# Patient Record
Sex: MALE | Race: WHITE | NOT HISPANIC OR LATINO | URBAN - METROPOLITAN AREA
[De-identification: names, ages, dates, MRNs, and addresses within clinical notes are randomized per-mention and may not be internally consistent; named-entity substitution may affect disease eponyms.]

---

## 2024-05-03 ENCOUNTER — OFFICE VISIT (OUTPATIENT)
Dept: PRIMARY CARE | Facility: CLINIC | Age: 49
End: 2024-05-03
Payer: COMMERCIAL

## 2024-05-03 VITALS
BODY MASS INDEX: 25.88 KG/M2 | HEIGHT: 68 IN | DIASTOLIC BLOOD PRESSURE: 71 MMHG | SYSTOLIC BLOOD PRESSURE: 110 MMHG | WEIGHT: 170.8 LBS | HEART RATE: 78 BPM

## 2024-05-03 DIAGNOSIS — Z00.00 ANNUAL PHYSICAL EXAM: Primary | ICD-10-CM

## 2024-05-03 DIAGNOSIS — R97.20 ELEVATED PSA: ICD-10-CM

## 2024-05-03 PROCEDURE — 99386 PREV VISIT NEW AGE 40-64: CPT | Performed by: INTERNAL MEDICINE

## 2024-05-03 PROCEDURE — 1036F TOBACCO NON-USER: CPT | Performed by: INTERNAL MEDICINE

## 2024-05-03 RX ORDER — BISMUTH SUBSALICYLATE 262 MG
1 TABLET,CHEWABLE ORAL DAILY
COMMUNITY

## 2024-05-03 ASSESSMENT — ENCOUNTER SYMPTOMS
LIGHT-HEADEDNESS: 0
SHORTNESS OF BREATH: 0
BACK PAIN: 1
UNEXPECTED WEIGHT CHANGE: 0
HEADACHES: 1
FREQUENCY: 0
DIZZINESS: 0
ARTHRALGIAS: 1
BLOOD IN STOOL: 0
SLEEP DISTURBANCE: 1
FEVER: 0
CONSTIPATION: 0
CHILLS: 0
FATIGUE: 0
DIFFICULTY URINATING: 0

## 2024-05-03 NOTE — PROGRESS NOTES
"Subjective   Patient ID: Michael Tellez is a 48 y.o. male who presents for Establish Care (White blood cell count low /).    Here to get established. Had physical with prior PCP recently. Labwork done showing low WBC count and PSA higher than normal.     PMH:  -Seen at [facility] recently was started on topical fluorouracil.  -Recent knee injury. Seen by ortho and recommended to start PT.    Tries to exercise 4-5 days a week. Will ride his bike and walk the dog.        Review of Systems   Constitutional:  Negative for chills, fatigue, fever and unexpected weight change.   Respiratory:  Negative for shortness of breath.    Cardiovascular:  Negative for chest pain.   Gastrointestinal:  Negative for blood in stool and constipation.   Genitourinary:  Negative for decreased urine volume, difficulty urinating and frequency.   Musculoskeletal:  Positive for arthralgias and back pain.   Neurological:  Positive for headaches. Negative for dizziness and light-headedness.   Psychiatric/Behavioral:  Positive for sleep disturbance.          /71 (BP Location: Right arm, Patient Position: Sitting, BP Cuff Size: Adult)   Pulse 78   Ht 1.73 m (5' 8.11\")   Wt 77.5 kg (170 lb 12.8 oz)   BMI 25.89 kg/m²   Objective   Physical Exam  Constitutional:       General: He is not in acute distress.     Appearance: He is not ill-appearing, toxic-appearing or diaphoretic.   HENT:      Head: Normocephalic and atraumatic.   Eyes:      General: No scleral icterus.     Conjunctiva/sclera: Conjunctivae normal.   Cardiovascular:      Rate and Rhythm: Normal rate and regular rhythm.      Heart sounds: No murmur heard.     No friction rub. No gallop.   Pulmonary:      Effort: Pulmonary effort is normal. No respiratory distress.      Breath sounds: No stridor. No wheezing, rhonchi or rales.   Abdominal:      General: Abdomen is flat. Bowel sounds are normal. There is no distension.      Palpations: Abdomen is soft.      Tenderness: " There is no abdominal tenderness. There is no guarding.   Musculoskeletal:      Cervical back: Normal range of motion and neck supple. No tenderness.   Lymphadenopathy:      Cervical: No cervical adenopathy.   Skin:     General: Skin is warm and dry.   Neurological:      Mental Status: He is alert.         Assessment/Plan   Problem List Items Addressed This Visit    None  Visit Diagnoses         Codes    Annual physical exam    -  Primary Z00.00    Elevated PSA     R97.20    Relevant Orders    Referral to Urology        -Labwork from last PCP reviewed w/ pt. Given family hx, pt would like urology referral within our system. Order placed.  -Pt to let us know if he develops symptoms like fevers, chills, night sweats, weight loss. Not currently worried about low WBC count in absence of symptoms. Pt's WBC has consistently been around 3-4.  -Discussed TDAP. Will follow up at next appt.  -To return in 1 year.         Annemarie Calloway MD 05/03/24 9:42 AM

## 2024-05-23 ENCOUNTER — OFFICE VISIT (OUTPATIENT)
Dept: UROLOGY | Facility: HOSPITAL | Age: 49
End: 2024-05-23
Payer: COMMERCIAL

## 2024-05-23 DIAGNOSIS — R97.20 ELEVATED PSA: ICD-10-CM

## 2024-05-23 PROCEDURE — 99214 OFFICE O/P EST MOD 30 MIN: CPT | Performed by: UROLOGY

## 2024-05-23 PROCEDURE — 1036F TOBACCO NON-USER: CPT | Performed by: UROLOGY

## 2024-05-23 NOTE — PROGRESS NOTES
"NPV     HISTORY OF PRESENT ILLNESS:   Michael Tellez is a 48 y.o. male who is being seen as a new patient today for elevated PSA. Fhx of prostate cancer (father and older brother)    PAST MEDICAL HISTORY:  No past medical history on file.    PAST SURGICAL HISTORY:  Past Surgical History:   Procedure Laterality Date    HERNIA REPAIR  2000    KNEE CARTILAGE SURGERY Right 2019    TONSILLECTOMY          ALLERGIES:   Allergies   Allergen Reactions    Codeine Nausea And Vomiting, Nausea Only and Hives     nausea        MEDICATIONS:   Current Outpatient Medications   Medication Instructions    multivitamin tablet 1 tablet, oral, Daily        PHYSICAL EXAM:  There were no vitals taken for this visit.  Constitutional: Patient appears well-developed and well-nourished. No distress.    Pulmonary/Chest: Effort normal. No respiratory distress.   Abdominal: Soft, ND NT  : WNL  Musculoskeletal: Normal range of motion.    Neurological: Alert and oriented to person, place, and time.  Psychiatric: Normal mood and affect. Behavior is normal. Thought content normal.      Labs:  No results found for: \"TESTOSTERONE\"  No results found for: \"PSA\"  Most recent PSA was 1.10 on 4/22/24, prior PSA was 0.811 on 9/26/23  No components found for: \"CBC\"  Lab Results   Component Value Date    CREATININE 1.05 05/16/2018     No components found for: \"TESTOTMS\"  No results found for: \"TESTF\"    Discussion:  Doing well, no complaints. Denies any dysuria, hematuria, bothersome urinary frequency, urgency, or flank pain. Patient denies any pushing or straining to urinate. Discussed with patient PSA was currently within WNL, pt reassured. Explained that he should return if his PSA elevates at greater than .75 in one years time. He should follow with PCP and DREs once yearly.    KAROL 25  AUA 21    Assessment:      1. Elevated PSA  Referral to Urology          Michael Tellez is a 48 y.o. male here for NPV     Plan:   Pt will continue to follow PSA with PSA " he was provided guidance on when to see further urological care.  All questions and concerns were addressed. Patient verbalizes understanding and has no other questions at this time.     Scribe Attestation  By signing my name below, I, Sweta Hernandez   attest that this documentation has been prepared under the direction and in the presence of Jb Mno MD.

## 2024-07-23 ENCOUNTER — OFFICE VISIT (OUTPATIENT)
Dept: ORTHOPEDIC SURGERY | Facility: CLINIC | Age: 49
End: 2024-07-23
Payer: COMMERCIAL

## 2024-07-23 VITALS — BODY MASS INDEX: 25.01 KG/M2 | WEIGHT: 165 LBS | HEIGHT: 68 IN

## 2024-07-23 DIAGNOSIS — M76.52 PATELLAR TENDINITIS, LEFT KNEE: Primary | ICD-10-CM

## 2024-07-23 DIAGNOSIS — M25.562 ACUTE PAIN OF LEFT KNEE: ICD-10-CM

## 2024-07-23 PROCEDURE — 3008F BODY MASS INDEX DOCD: CPT | Performed by: FAMILY MEDICINE

## 2024-07-23 PROCEDURE — 99203 OFFICE O/P NEW LOW 30 MIN: CPT | Performed by: FAMILY MEDICINE

## 2024-07-23 PROCEDURE — 1036F TOBACCO NON-USER: CPT | Performed by: FAMILY MEDICINE

## 2024-07-23 NOTE — PROGRESS NOTES
History of Present Illness   Chief Complaint   Patient presents with    Left Knee - Pain     May have injured while skiing 3/2024  +pain with some swelling       The patient is 48 y.o. male  here with a complaint of left knee pain.  Onset of symptoms 4 months ago, says that he was skiing in [place], had a fall, believes his ski did pop off, had some pain in his knee after this, it was his last day skiing, says he was able to finish the day skiing but with some pain.  Symptoms persisted over the next month prompting him to be seen by orthopedic provider [facility] who he has seen in the past for a right knee meniscus tear.  He says that he had x-rays done then and was told they were normal, he was referred to physical therapy which he did for approximately 6 weeks and has continued doing exercises on his own.  He feels like symptoms have not improved much since starting physical therapy.  He points to the anterior aspect of his knee as area of discomfort.  Patient is active he enjoys working out, he does have pain with squatting, higher levels of activity, he is also having some discomfort with normal day-to-day activity at times, he feels pain when he is lying in bed at night.  He denies any swelling, no locking or catching, no feelings of instability.  He has a big ski trip next winter to [place] that he is hoping to be in better shape for with regards to his knee.  He has tried over-the-counter medications for pain.    No past medical history on file.    Medication Documentation Review Audit       Reviewed by Annemarie Calloway MD (Physician) on 05/03/24 at 0837      Medication Order Taking? Sig Documenting Provider Last Dose Status   multivitamin tablet [id number]  Take 1 tablet by mouth once daily. Historical Provider, MD  Active                    Allergies   Allergen Reactions    Codeine Nausea And Vomiting, Nausea Only and Hives     nausea       Social History     Socioeconomic History     Marital status:      Spouse name: Not on file    Number of children: Not on file    Years of education: Not on file    Highest education level: Not on file   Occupational History    Not on file   Tobacco Use    Smoking status: Never    Smokeless tobacco: Never   Substance and Sexual Activity    Alcohol use: Yes     Alcohol/week: 1.0 standard drink of alcohol     Types: 1 Cans of beer per week     Comment: 4-5 beers a week.    Drug use: Never    Sexual activity: Not on file   Other Topics Concern    Not on file   Social History Narrative    Not on file     Social Determinants of Health     Financial Resource Strain: Low Risk  (9/11/2023)    Received from [employer]    Overall Financial Resource Strain (CARDIA)     Difficulty of Paying Living Expenses: Not hard at all   Food Insecurity: No Food Insecurity (9/11/2023)    Received from [employer]    Hunger Vital Sign     Worried About Running Out of Food in the Last Year: Never true     Ran Out of Food in the Last Year: Never true   Transportation Needs: No Transportation Needs (9/11/2023)    Received from [employer]    PRAPARE - Transportation     Lack of Transportation (Medical): No     Lack of Transportation (Non-Medical): No   Physical Activity: Sufficiently Active (9/11/2023)    Received from [employer]    Exercise Vital Sign     Days of Exercise per Week: 7 days     Minutes of Exercise per Session: 60 min   Stress: No Stress Concern Present (9/11/2023)    Received from [employer]    [employer] [place] [employer] - Occupational Stress Questionnaire     Feeling of Stress : Only a little   Social Connections: Socially Integrated (9/11/2023)    Received from [employer]    Social Connection and Isolation Panel [NHANES]     Frequency of Communication with Friends and Family: More than three times a week     Frequency of Social Gatherings with Friends and Family: Once  a week     Attends Jehovah's witness Services: More than 4 times per year     Active Member of Clubs or Organizations: Yes     Attends Club or Organization Meetings: More than 4 times per year     Marital Status:    Intimate Partner Violence: Not At Risk (9/11/2023)    Received from [employer]    Humiliation, Afraid, Rape, and Kick questionnaire     Fear of Current or Ex-Partner: No     Emotionally Abused: No     Physically Abused: No     Sexually Abused: No   Housing Stability: Unknown (9/11/2023)    Received from [employer]    Housing Stability Vital Sign     Unable to Pay for Housing in the Last Year: No     Number of Places Lived in the Last Year: Not on file     Unstable Housing in the Last Year: No       Past Surgical History:   Procedure Laterality Date    HERNIA REPAIR  2000    KNEE CARTILAGE SURGERY Right 2019    TONSILLECTOMY            Review of Systems   GENERAL: Negative  GI: Negative  MUSCULOSKELETAL: See HPI  SKIN: Negative  NEURO:  Negative     Physical Exam:    General/Constitutional: well appearing, no distress, appears stated age  HEENT: sclera clear  Respiratory: non labored breathing  Vascular: No edema, swelling or tenderness, except as noted in detailed exam.  Integumentary: No impressive skin lesions present, except as noted in detailed exam.  Neurological:  Alert and oriented   Psychological:  Normal mood and affect.  Musculoskeletal: Normal, except as noted in detailed exam and in HPI.  Normal gait, unassisted    Left knee: Normal appearance, there is no swelling, there is no redness or warmth, no joint effusion is present.  He is most focally tender palpation at the proximal aspect of the patella tendon just distal to the inferior pole of the patella.  There is some very mild anterior lateral joint tenderness, there is no medial joint tenderness.  Range of motion from 0 to 130 degrees without pain.  No motor deficits are present, he does have some mild discomfort  with resisted knee extension.  Negative Vee, negative Lachman, negative posterior drawer.  Stable to varus and valgus stress.       Imaging: No imaging today, office note from prior orthopedic visit available, x-ray obtained at that time and was interpreted as unremarkable by orthopedic provider there, no images to review today      Assessment   1. Patellar tendinitis, left knee        2. Acute pain of left knee  MR knee left wo IV contrast            Plan: Left knee pain, ongoing for the past 4 months after injury skiing, today he has focal discomfort at the proximal patella tendon, there is a very minimal lateral joint tenderness anteriorly, he has done physical therapy for greater than 6 weeks with minimal change in symptoms.  Given this I would like to obtain an MRI to evaluate for patella tendinopathy including partial-thickness tearing to help further guide treatment which may involve minimally invasive treatment such as PRP or percutaneous tenotomy versus open treatment, MRI to evaluate for other internal derangement including possible lateral meniscus tear as well.  Patient will continue to modify activities as tolerated by symptoms, further treatment pending MRI results.

## 2024-08-07 ENCOUNTER — TELEPHONE (OUTPATIENT)
Dept: ORTHOPEDIC SURGERY | Facility: CLINIC | Age: 49
End: 2024-08-07
Payer: COMMERCIAL

## 2024-08-07 ENCOUNTER — HOSPITAL ENCOUNTER (OUTPATIENT)
Dept: RADIOLOGY | Facility: CLINIC | Age: 49
Discharge: HOME | End: 2024-08-07
Payer: COMMERCIAL

## 2024-08-07 DIAGNOSIS — M25.562 ACUTE PAIN OF LEFT KNEE: ICD-10-CM

## 2024-08-07 PROCEDURE — 73721 MRI JNT OF LWR EXTRE W/O DYE: CPT | Mod: LT

## 2024-08-07 PROCEDURE — 73721 MRI JNT OF LWR EXTRE W/O DYE: CPT | Mod: LEFT SIDE | Performed by: RADIOLOGY

## 2024-08-22 ENCOUNTER — APPOINTMENT (OUTPATIENT)
Dept: ORTHOPEDIC SURGERY | Facility: CLINIC | Age: 49
End: 2024-08-22
Payer: COMMERCIAL

## 2024-08-22 DIAGNOSIS — M76.52 PATELLAR TENDINITIS OF LEFT KNEE: Primary | ICD-10-CM

## 2024-08-22 PROCEDURE — 99214 OFFICE O/P EST MOD 30 MIN: CPT | Performed by: FAMILY MEDICINE

## 2024-08-22 PROCEDURE — 1036F TOBACCO NON-USER: CPT | Performed by: FAMILY MEDICINE

## 2024-08-22 NOTE — PROGRESS NOTES
History of Present Illness   Chief Complaint   Patient presents with    Left Knee - Follow-up     FUV L KNEE MRI REVIEW       The patient is 48 y.o. male  here for follow-up of left knee pain.  Patient was seen by me 1 month ago, onset of symptoms 5 months ago, see previous note for full details.  Initial symptoms started after a fall while skiing, did continue skiing that day with some discomfort.  Symptoms have persisted, he did see outside orthopedic provider, was referred to physical therapy, x-rays unremarkable at that time.  He did do physical therapy for greater than 6 weeks and continued exercises on his own with minimal improvement in symptoms.  He was complaining of pain at the anterior knee near the patella tendon as area of discomfort.  He was denying any significant instability.  He was having some pain with squatting, higher levels of activity including biking.  Given ongoing symptoms despite conservative management we recommended MRI for evaluation of patella tendinopathy, other derangement of the knee.  He says symptoms have remained the same, slightly worsened over the past month, noticing pain more when he is riding his bike.  He continues to deny any significant instability, locking or catching, no swelling.    History reviewed. No pertinent past medical history.    Medication Documentation Review Audit       Reviewed by Laron Cotto MA (Medical Assistant) on 08/22/24 at 1423      Medication Order Taking? Sig Documenting Provider Last Dose Status   multivitamin tablet [id number]  Take 1 tablet by mouth once daily. Historical Provider, MD  Active                    Allergies   Allergen Reactions    Codeine Nausea And Vomiting, Nausea Only and Hives     nausea       Social History     Socioeconomic History    Marital status:      Spouse name: Not on file    Number of children: Not on file    Years of education: Not on file    Highest education level: Not on file   Occupational  History    Not on file   Tobacco Use    Smoking status: Never    Smokeless tobacco: Never   Substance and Sexual Activity    Alcohol use: Yes     Alcohol/week: 1.0 standard drink of alcohol     Types: 1 Cans of beer per week     Comment: 4-5 beers a week.    Drug use: Never    Sexual activity: Not on file   Other Topics Concern    Not on file   Social History Narrative    Not on file     Social Determinants of Health     Financial Resource Strain: Low Risk  (9/11/2023)    Received from [employer]    Overall Financial Resource Strain (CARDIA)     Difficulty of Paying Living Expenses: Not hard at all   Food Insecurity: No Food Insecurity (9/11/2023)    Received from [employer]    Hunger Vital Sign     Worried About Running Out of Food in the Last Year: Never true     Ran Out of Food in the Last Year: Never true   Transportation Needs: No Transportation Needs (9/11/2023)    Received from [employer]    PRAPARE - Transportation     Lack of Transportation (Medical): No     Lack of Transportation (Non-Medical): No   Physical Activity: Sufficiently Active (9/11/2023)    Received from [employer]    Exercise Vital Sign     Days of Exercise per Week: 7 days     Minutes of Exercise per Session: 60 min   Stress: No Stress Concern Present (9/11/2023)    Received from [employer]    [place] [employer]ational [employer] - Occupational Stress Questionnaire     Feeling of Stress : Only a little   Social Connections: Socially Integrated (9/11/2023)    Received from [employer]    Social Connection and Isolation Panel [NHANES]     Frequency of Communication with Friends and Family: More than three times a week     Frequency of Social Gatherings with Friends and Family: Once a week     Attends Cheondo[employer] Services: More than 4 times per year     Active Member of Clubs or Organizations: Yes     Attends Club or Organization Meetings: More than 4 times per  year     Marital Status:    Intimate Partner Violence: Not At Risk (9/11/2023)    Received from [employer]    Humiliation, Afraid, Rape, and Kick questionnaire     Fear of Current or Ex-Partner: No     Emotionally Abused: No     Physically Abused: No     Sexually Abused: No   Housing Stability: Unknown (9/11/2023)    Received from [employer]    Housing Stability Vital Sign     Unable to Pay for Housing in the Last Year: No     Number of Places Lived in the Last Year: Not on file     Unstable Housing in the Last Year: No       Past Surgical History:   Procedure Laterality Date    HERNIA REPAIR  2000    KNEE CARTILAGE SURGERY Right 2019    TONSILLECTOMY            Review of Systems   GENERAL: Negative  GI: Negative  MUSCULOSKELETAL: See HPI  SKIN: Negative  NEURO:  Negative     Physical Exam:    General/Constitutional: well appearing, no distress, appears stated age  HEENT: sclera clear  Respiratory: non labored breathing  Vascular: No edema, swelling or tenderness, except as noted in detailed exam.  Integumentary: No impressive skin lesions present, except as noted in detailed exam.  Neurological:  Alert and oriented   Psychological:  Normal mood and affect.  Musculoskeletal: Normal, except as noted in detailed exam and in HPI.  Normal gait, unassisted    Left knee: Normal appearance, there is no swelling, there is no redness or warmth, no joint effusion is present.  He is most focally tender palpation at the proximal aspect of the patella tendon just distal to the inferior pole of the patella.  There is some very mild anterior lateral joint tenderness, there is no medial joint tenderness.  Range of motion from 0 to 130 degrees without pain.  No motor deficits are present, he does have some mild discomfort with resisted knee extension.  Negative Vee, negative posterior drawer.  Stable to varus and valgus stress.  There is some mild laxity with Lachman maneuver compared to the  right though appears to have a firm endpoint       Imaging: MRI of the left knee was reviewed with patient, there is a high-grade partial-thickness undersurface tear of the proximal patella tendon, there is some increased signal within the midportion of the patella tendon suggestive of strain.  There is some increased signal within the anterior cruciate ligament fibers do appear intact.  Lateral meniscus tear is also present at the anterior horn.    Assessment   1. Patellar tendinitis of left knee          MRI shows high-grade partial-thickness tear of the proximal patella tendon, there is also questionable injury to the ACL      Plan: Discussed further workup and treatment with patient.  We discussed nonoperative treatment for patella tendon tear, given degree of tearing I do not think percutaneous tenotomy would be of his much benefit, we discussed possibility of PRP injection.  We also discussed consideration of surgical repair.  He was interested in surgical evaluation.  I will have him follow-up with Dr. Buck for surgical consultation.  Also recommended further evaluation of ACL at that visit to determine if any further treatment potentially indicated for this though he is denying any laxity of the knee.

## 2024-09-16 ENCOUNTER — HOSPITAL ENCOUNTER (OUTPATIENT)
Dept: RADIOLOGY | Facility: CLINIC | Age: 49
Discharge: HOME | End: 2024-09-16
Payer: COMMERCIAL

## 2024-09-16 ENCOUNTER — OFFICE VISIT (OUTPATIENT)
Dept: ORTHOPEDIC SURGERY | Facility: CLINIC | Age: 49
End: 2024-09-16
Payer: COMMERCIAL

## 2024-09-16 VITALS — WEIGHT: 165 LBS | BODY MASS INDEX: 25.01 KG/M2 | HEIGHT: 68 IN

## 2024-09-16 DIAGNOSIS — M25.562 ACUTE PAIN OF LEFT KNEE: ICD-10-CM

## 2024-09-16 PROCEDURE — 1036F TOBACCO NON-USER: CPT | Performed by: ORTHOPAEDIC SURGERY

## 2024-09-16 PROCEDURE — 3008F BODY MASS INDEX DOCD: CPT | Performed by: ORTHOPAEDIC SURGERY

## 2024-09-16 PROCEDURE — 99204 OFFICE O/P NEW MOD 45 MIN: CPT | Performed by: ORTHOPAEDIC SURGERY

## 2024-09-16 PROCEDURE — 73564 X-RAY EXAM KNEE 4 OR MORE: CPT | Mod: LT

## 2024-09-16 PROCEDURE — 99214 OFFICE O/P EST MOD 30 MIN: CPT | Performed by: ORTHOPAEDIC SURGERY

## 2024-09-16 NOTE — PROGRESS NOTES
48-year-old is seen with left knee anterior knee pain.  He has been having intermittent severe sharp shooting pain along the patellar tendon insertion at the inferior pole of the patella.  He has had conservative treatment with Dr. Rolle.  Pain started after a fall while skiing in March 2024.  He denies giving out or instability.    Pleasant in no acute distress.  Walks with a normal gait.  Left knee has no effusion and a range of motion of 0 to 130 degrees.  There is a firm endpoint with Lachman with minimal translation.  The knee is stable to varus and valgus stress.  There is tenderness along the inferior pole of the patella.    Multiple x-ray views of the left knee are personally reviewed and there is no acute bony abnormality.    MRI of the left knee is personally reviewed and there is partial thickness tearing involving the patellar origin of the patellar tendon.  There are areas of signal change within the ACL and MCL.  There is a subtle vertical tear involving the ant horn of the lateral meniscus.    A discussion about his knee was done.  He is primarily symptomatic with the partial thickness tearing involving the patellar tendon insertion.  He has symptoms primarily when skiing.  Treatment options including no treatment reviewed.  Surgery and the postoperative course were discussed in regard to debridement and repair of the partial-thickness tearing involving the patellar tendon.  If he decides to proceed with this he would call to arrange and would also be seen in follow-up again.

## 2024-11-08 ENCOUNTER — OFFICE VISIT (OUTPATIENT)
Dept: ORTHOPEDIC SURGERY | Facility: CLINIC | Age: 49
End: 2024-11-08
Payer: COMMERCIAL

## 2024-11-08 DIAGNOSIS — S83.271A COMPLEX TEAR OF LATERAL MENISCUS OF RIGHT KNEE AS CURRENT INJURY, INITIAL ENCOUNTER: ICD-10-CM

## 2024-11-08 DIAGNOSIS — S86.812A PATELLAR TENDON STRAIN, LEFT, INITIAL ENCOUNTER: Primary | ICD-10-CM

## 2024-11-08 PROCEDURE — 99214 OFFICE O/P EST MOD 30 MIN: CPT | Performed by: ORTHOPAEDIC SURGERY

## 2024-11-08 PROCEDURE — 1036F TOBACCO NON-USER: CPT | Performed by: ORTHOPAEDIC SURGERY

## 2024-11-08 RX ORDER — CEFAZOLIN SODIUM 2 G/100ML
2 INJECTION, SOLUTION INTRAVENOUS ONCE
OUTPATIENT
Start: 2024-11-08 | End: 2024-11-08

## 2024-11-08 NOTE — LETTER
November 8, 2024     Annemarie Calloway MD  [address]  [address]  [facility] [address]    Patient: Michael Tellez   YOB: 1975   Date of Visit: 11/8/2024       Dear Dr. Annemarie Calloway MD:    Thank you for referring Michael Tellez to me for evaluation. Below are my notes for this consultation.  If you have questions, please do not hesitate to call me. I look forward to following your patient along with you.       Sincerely,     Peter Buck MD      CC: No Recipients  ______________________________________________________________________________________    49-year-old is seen with left knee pain.  He has been having persistent severe sharp shooting pain along the anterior aspect of his knee.  He works in [employer].  He skis frequently.  He fell skiing in March 2024 and has been having increased pain since then.  He said conservative treatment but still has symptoms.    Pleasant in no acute distress.  Walks with a normal gait.  Left knee has trace effusion and a range of motion of 2 to 125 degrees.  There is a firm endpoint with Lachman and minimal translation.  Knee stable to varus and valgus stress and posterior drawer.  No posterior lateral instability.  Minimal joint line tenderness.  He is tender along the inferior pole of the patella.  Right knee range of motion is 0 to 135 degrees without effusion instability or tenderness.  Both lower extremities are well-perfused the skin is intact and muscle tone is adequate.    Multiple x-ray views of the left knee are personally reviewed and there is no acute bony abnormality.    MRI of the left knee is personally reviewed and there is partial-thickness tearing involving the patellar tendon origin at the inferior pole of the patella.  There is signal change within the ACL but the ligament appears to be intact.  There is subtle vertical tearing in the anterior horn of the lateral meniscus.    A detailed discussion about his knee was done.  Treatment  options including no treatment reviewed.  He has been through extensive conservative treatment but still has debilitating pain.  Decision was made to proceed with left knee arthroscopy with treatment of the meniscus as needed and with debridement and repair of the patellar tendon.  Platelet rich plasma would also be injected at the patellar tendon site.  Surgery and postoperative course reviewed in detail.  Risks including but not limited to infection pulm embolus neurovascular injury medical problems and stiffness were reviewed and he understands this and has elected to proceed.

## 2024-11-08 NOTE — PROGRESS NOTES
49-year-old is seen with left knee pain.  He has been having persistent severe sharp shooting pain along the anterior aspect of his knee.  He works in [employer].  He skis frequently.  He fell skiing in March 2024 and has been having increased pain since then.  He said conservative treatment but still has symptoms.    Pleasant in no acute distress.  Walks with a normal gait.  Left knee has trace effusion and a range of motion of 2 to 125 degrees.  There is a firm endpoint with Lachman and minimal translation.  Knee stable to varus and valgus stress and posterior drawer.  No posterior lateral instability.  Minimal joint line tenderness.  He is tender along the inferior pole of the patella.  Right knee range of motion is 0 to 135 degrees without effusion instability or tenderness.  Both lower extremities are well-perfused the skin is intact and muscle tone is adequate.    Multiple x-ray views of the left knee are personally reviewed and there is no acute bony abnormality.    MRI of the left knee is personally reviewed and there is partial-thickness tearing involving the patellar tendon origin at the inferior pole of the patella.  There is signal change within the ACL but the ligament appears to be intact.  There is subtle vertical tearing in the anterior horn of the lateral meniscus.    A detailed discussion about his knee was done.  Treatment options including no treatment reviewed.  He has been through extensive conservative treatment but still has debilitating pain.  Decision was made to proceed with left knee arthroscopy with treatment of the meniscus as needed and with debridement and repair of the patellar tendon.  Platelet rich plasma would also be injected at the patellar tendon site.  Surgery and postoperative course reviewed in detail.  Risks including but not limited to infection pulm embolus neurovascular injury medical problems and stiffness were reviewed and he understands this and has elected to  proceed.

## 2024-11-11 ENCOUNTER — HOSPITAL ENCOUNTER (OUTPATIENT)
Facility: HOSPITAL | Age: 49
Setting detail: OUTPATIENT SURGERY
End: 2024-11-11
Attending: ORTHOPAEDIC SURGERY | Admitting: ORTHOPAEDIC SURGERY
Payer: COMMERCIAL

## 2024-11-11 PROBLEM — S86.812A PATELLAR TENDON STRAIN, LEFT, INITIAL ENCOUNTER: Status: ACTIVE | Noted: 2024-11-08

## 2024-12-12 RX ORDER — CHLORHEXIDINE GLUCONATE ORAL RINSE 1.2 MG/ML
15 SOLUTION DENTAL DAILY
Qty: 30 ML | Refills: 0 | Status: SHIPPED | OUTPATIENT
Start: 2024-12-12 | End: 2024-12-14

## 2024-12-12 RX ORDER — CHLORHEXIDINE GLUCONATE 40 MG/ML
SOLUTION TOPICAL DAILY
Qty: 118 ML | Refills: 0 | Status: SHIPPED | OUTPATIENT
Start: 2024-12-12 | End: 2024-12-17

## 2024-12-13 ENCOUNTER — PRE-ADMISSION TESTING (OUTPATIENT)
Dept: PREADMISSION TESTING | Facility: HOSPITAL | Age: 49
End: 2024-12-13
Payer: COMMERCIAL

## 2024-12-13 VITALS
HEART RATE: 64 BPM | TEMPERATURE: 95.9 F | WEIGHT: 173.5 LBS | OXYGEN SATURATION: 98 % | HEIGHT: 68 IN | BODY MASS INDEX: 26.3 KG/M2 | RESPIRATION RATE: 20 BRPM

## 2024-12-13 DIAGNOSIS — Z01.818 PREOP TESTING: Primary | ICD-10-CM

## 2024-12-13 DIAGNOSIS — S86.812A STRAIN OF LEFT PATELLAR TENDON, INITIAL ENCOUNTER: ICD-10-CM

## 2024-12-13 LAB
ANION GAP SERPL CALC-SCNC: 9 MMOL/L (ref 10–20)
BUN SERPL-MCNC: 15 MG/DL (ref 6–23)
CALCIUM SERPL-MCNC: 9.5 MG/DL (ref 8.6–10.3)
CHLORIDE SERPL-SCNC: 104 MMOL/L (ref 98–107)
CO2 SERPL-SCNC: 28 MMOL/L (ref 21–32)
CREAT SERPL-MCNC: 1 MG/DL (ref 0.5–1.3)
EGFRCR SERPLBLD CKD-EPI 2021: >90 ML/MIN/1.73M*2
ERYTHROCYTE [DISTWIDTH] IN BLOOD BY AUTOMATED COUNT: 11.5 % (ref 11.5–14.5)
GLUCOSE SERPL-MCNC: 94 MG/DL (ref 74–99)
HCT VFR BLD AUTO: 44.3 % (ref 41–52)
HGB BLD-MCNC: 15.2 G/DL (ref 13.5–17.5)
MCH RBC QN AUTO: 32.8 PG (ref 26–34)
MCHC RBC AUTO-ENTMCNC: 34.3 G/DL (ref 32–36)
MCV RBC AUTO: 96 FL (ref 80–100)
NRBC BLD-RTO: 0 /100 WBCS (ref 0–0)
PLATELET # BLD AUTO: 210 X10*3/UL (ref 150–450)
POTASSIUM SERPL-SCNC: 4.2 MMOL/L (ref 3.5–5.3)
RBC # BLD AUTO: 4.63 X10*6/UL (ref 4.5–5.9)
SODIUM SERPL-SCNC: 137 MMOL/L (ref 136–145)
WBC # BLD AUTO: 4.2 X10*3/UL (ref 4.4–11.3)

## 2024-12-13 PROCEDURE — 87081 CULTURE SCREEN ONLY: CPT | Mod: PARLAB

## 2024-12-13 PROCEDURE — 80048 BASIC METABOLIC PNL TOTAL CA: CPT

## 2024-12-13 PROCEDURE — 36415 COLL VENOUS BLD VENIPUNCTURE: CPT

## 2024-12-13 PROCEDURE — 85027 COMPLETE CBC AUTOMATED: CPT

## 2024-12-13 PROCEDURE — 99203 OFFICE O/P NEW LOW 30 MIN: CPT | Performed by: NURSE PRACTITIONER

## 2024-12-13 ASSESSMENT — PAIN - FUNCTIONAL ASSESSMENT: PAIN_FUNCTIONAL_ASSESSMENT: 0-10

## 2024-12-13 ASSESSMENT — DUKE ACTIVITY SCORE INDEX (DASI)
CAN YOU HAVE SEXUAL RELATIONS: YES
CAN YOU DO YARD WORK LIKE RAKING LEAVES, WEEDING OR PUSHING A MOWER: NO
CAN YOU RUN A SHORT DISTANCE: NO
CAN YOU PARTICIPATE IN MODERATE RECREATIONAL ACTIVITIES LIKE GOLF, BOWLING, DANCING, DOUBLES TENNIS OR THROWING A BASEBALL OR FOOTBALL: NO
CAN YOU DO MODERATE WORK AROUND THE HOUSE LIKE VACUUMING, SWEEPING FLOORS OR CARRYING GROCERIES: YES
CAN YOU WALK INDOORS, SUCH AS AROUND YOUR HOUSE: YES
CAN YOU DO HEAVY WORK AROUND THE HOUSE LIKE SCRUBBING FLOORS OR LIFTING AND MOVING HEAVY FURNITURE: NO
CAN YOU CLIMB A FLIGHT OF STAIRS OR WALK UP A HILL: YES
CAN YOU TAKE CARE OF YOURSELF (EAT, DRESS, BATHE, OR USE TOILET): YES
CAN YOU DO LIGHT WORK AROUND THE HOUSE LIKE DUSTING OR WASHING DISHES: YES
CAN YOU WALK A BLOCK OR TWO ON LEVEL GROUND: YES

## 2024-12-13 ASSESSMENT — PAIN SCALES - GENERAL: PAINLEVEL_OUTOF10: 0 - NO PAIN

## 2024-12-13 NOTE — PREPROCEDURE INSTRUCTIONS
Medication List            Accurate as of December 13, 2024  8:06 AM. Always use your most recent med list.                * chlorhexidine 0.12 % solution  Commonly known as: Peridex  Use 15 mL in the mouth or throat once daily for 2 doses. Swish and Spit day before surgery and again morning on day of surgery.     * chlorhexidine 4 % external liquid  Commonly known as: Hibiclens  Apply topically once daily for 5 days. Wash daily for 5 days prior to surgery with day 5 being morning of surgery.     multivitamin tablet  Additional Medication Adjustments for Surgery: Take last dose 7 days before surgery           * This list has 2 medication(s) that are the same as other medications prescribed for you. Read the directions carefully, and ask your doctor or other care provider to review them with you.                                  NPO Instructions:    Do not eat any food after midnight the night before your surgery/procedure.    Additional Instructions:     Day of Surgery:  Wear  comfortable loose fitting clothing  Do not use moisturizers, creams, lotions or perfume        PRE-OPERATIVE INSTRUCTIONS FOR SURGERY    *Do not eat anything after midnight the night of surgery.  This includes food of any kind (including hard candy, cough drops, mints).   You may have up to 13 ounces of clear liquid  until TWO hours prior to your scheduled surgery time, unless ERAS* protocol is ordered for you.  Clear liquids include water, black tea/coffee, (no milk or cream) apple juice and electrolyte drinks (GATORADE).  You may chew gum until TWO hours prior you your surgery/procedure.     *ERAS protocol: follow as instructed.  DO not drink an additional 13 ounces as noted above.        *One of our staff members will call you ONE business day before your surgery, between 11 am-2 pm to let you know the time to arrive.  If you have not received a call by 2 pm, call [phone]  *When you arrive at the hospital-->GO TO Registration on the  ground floor  *Stop smoking 24 hours prior to surgery.  No Marijuana, CBD Oil or Vaping for 48 hours  *No alcohol 24 hours prior to surgery  *You will need a responsible adult to drive you home  -No acrylic nails or nail polish on at least one fingernail, NO polish on toes for foot surgery  -You may be asked to remove your dentures, partial plate, eyeglasses or contact lenses before going to surgery.  Please bring a case for these items.  -Body piercings need to be removed.  Jewelry and valuables should be left at home.  -Put on loose,  comfortable, clean clothing, that will accommodate bandages        What is a home antibacterial shower?  This shower is a way of cleaning the skin with a germ killing solution before surgery.  The solution contains chlorhexidine, commonly known as CHG.  CHG is a skin cleanser with germ killing ability.  Let your doctor know if you are allergic to chlorhexidine.    Why do I need to take a preoperative antibacterial shower?  Skin is not sterile.  It is best to try to make your skin as free of germs as possible before surgery.  Proper cleansing with a germ killing soap before surgery can lower the number of germs on your skin.  This helps to reduce the risk of infection at the surgical site.  Following the instructions listed below will help you prepare your skin for surgery.      How do I use the solution?    Steps: Begin using your CHG soap 5 days before your surgery on __________________.    *First, wash and rinse your hair using the CHG soap.  Keep CHG soap away from ear canals and eyes.   Rinse completely, do not condition.  Hair extensions should be removed.    *Wash your face with your normal soap and rinse.   *Apply the CHG solution to a clean wet washcloth.  Turn the water off or move away from the water spray to avoid premature rinsing of the CHG soap as you are applying.  Firmly lather your entire body from the neck down.  Do not use on your face.    *Pay special attention to  the area(s) where your incision(s) will be located unless they are on your face.  Avoid scrubbing your skin too hard.  The important part is to have the CHG soap sit on your skin for 3 minutes.   *When the 3 minutes are up, turn on the water and rinse the CHG solution off your body completely.  *Do not wash with regular soap after you  have  used the CHG soap solution.  *Pat  yourself dry with a clean, freshly laundered towel.  *Do not apply powders, deodorants or lotions.  *Dress in clean freshly laundered night clothes.    *Be sure to sleep with clean freshly laundered sheets.    *Be aware the CHG will cause stains on fabrics; if you wash them with bleach after use.  Rinse your washcloth and other linens that have contact with CHG completely.  Use only non-chlorine detergents to launder the items  used.  *The morning of surgery is the fifth day.  Repeat the above steps and dress in clean comfortable clothing.     What is oral/dental rinse?  It is mouthwash.  It is a way of cleaning the he mouth with a germ-killing solution before your surgery.  The solution contains chlorhexidine, commonly known as CHG.  It is used inside the mouth to kill a bacteria known as Staphylococcus aureus.  Let your doctor know if you are allergic to Chlorhexidine.    Why do I need to use CHG oral/ dental rinse?  The CHG oral/dental rinse helps to kill bacteria in your mouth know as Staphylococcus aureus.  This reduces the risk of infection at the surgical site.    Using your CHG oral/dental rinse    STEPS:    Use your CHG oral/dental rinse after you brush your teeth the night before (at bedtime) and the morning of your surgery.  Follow all the directions on your prescription label.  *Use the cap on the container to measure 15 ml  *Swish (gargle if you can) the mouthwash in your mouth for at least 30 seconds, (do not swallow) and spit out  *After you use your CHG rinse, do not rinse your mouth with water, drink or eat.  Please refer to  the prescription label for the appropriate time to resume oral intake.    What side effects might I have using the CHG oral/dental rinse?  CHG rinse will stick you plaque on the teeth.  Brush and floss just before use.   Teeth brushing will help avoid staining of the plaque during  use.  Teeth brushing will help avoid staining of plaque during  use.    Who should I contact if I have questions about the CHG oral/dental rinse and or CHG soap?  Please call [facility], Pre-Admission testing at [phone] if you have any questions.    What you may be asked to bring to surgery:  ___Crutches, walker  ___CPAP machine  ___Urine specimen

## 2024-12-13 NOTE — CPM/PAT H&P
CPM/PAT Evaluation       Name: Michael Tellez (Michael Tellez)  /Age: 1975/49 y.o.     In-Person       Chief Complaint: PAT for planned Left knee procedure    49 yr old male w/no significant medical hx except for elevated PSA, who injured his knee during a skiing trip, now with Left knee tendon strain referred to PAT for planned Left knee artrhoscopy w/partial lateral meniscectomy and Left knee patella tendon repair w/Dr Buck on 2024      Patient reports feeling overall well, denies fever, cough or recent infection. Reports remaining otherwise physically active, routinely walking, working out at gym and bicycling during good weather; denies cardiac or respiratory symptoms. Past surgical hx includes tonsillectomy (as a child), Right knee cartilage surgery () and hernia repair x2 (, revision ); denies past issues with anesthesia.      Followed by PCP (Annmearie Calloway MD) - last visit 5/3/2024; consistent low WBC count (3-4), no concerns per PCP note as patient is asymptomatic  Followed by urology (Jb Mon MD) - last visit 2024             No past medical history on file.    Past Surgical History:   Procedure Laterality Date    HERNIA REPAIR  2000    KNEE CARTILAGE SURGERY Right 2019    TONSILLECTOMY         Patient  has no history on file for sexual activity.    Family History   Problem Relation Name Age of Onset    Breast cancer Mother      Prostate cancer Father      Leukemia Father      Prostate cancer Brother      Heart attack Maternal Grandfather          In his 60s    Heart attack Paternal Grandfather          Age 40       Allergies   Allergen Reactions    Codeine Nausea And Vomiting, Nausea Only and Hives     nausea       Prior to Admission medications    Medication Sig Start Date End Date Taking? Authorizing Provider   chlorhexidine (Hibiclens) 4 % external liquid Apply topically once daily for 5 days. Wash daily for 5 days prior to surgery with day 5 being morning of surgery.  12/12/24 12/17/24  TREMAYNE Nair   chlorhexidine (Peridex) 0.12 % solution Use 15 mL in the mouth or throat once daily for 2 doses. Swish and Spit day before surgery and again morning on day of surgery. 12/12/24 12/14/24  TREMAYNE Nair   multivitamin tablet Take 1 tablet by mouth once daily.    Historical Provider, MD        Review of Systems    Constitutional: no fever, no chills and not feeling poorly.   Eyes: no eyesight problems.   ENT: no hearing loss, no nosebleeds and no sore throat.   Cardiovascular: no chest pain, no palpitations and no extremity edema.   Respiratory: no sob, no wheezing, no cough and no sob w/exertion.   Gastrointestinal: negative for abdominal pain, blood in stools or changes in bowel habits   Genitourinary: negative for dysuria, incontinence or changes in urinary habits   Musculoskeletal: +Left knee pain, ambulates independently.   Integumentary: negative for lesions, rash or itching.   Neurological: negative for confusion, dizziness or fainting.   Psychiatric: not suicidal, no anxiety and no depression.   All other systems have been reviewed and are negative for complaint.     Physical Exam  Vitals reviewed.   HENT:      Head: Normocephalic.   Eyes:      Pupils: Pupils are equal, round, and reactive to light.      Comments: Corrective lenses in use   Cardiovascular:      Rate and Rhythm: Normal rate and regular rhythm.      Pulses: Normal pulses.   Pulmonary:      Effort: Pulmonary effort is normal.      Breath sounds: Normal breath sounds.   Abdominal:      General: Bowel sounds are normal.   Musculoskeletal:         General: Normal range of motion.      Cervical back: Normal range of motion.   Skin:     General: Skin is warm and dry.   Neurological:      Mental Status: He is alert and oriented to person, place, and time.   Psychiatric:         Mood and Affect: Mood normal.          PAT AIRWAY:   Airway:     Mallampati::  II    TM distance::  >3 FB     "Neck ROM::  Full  normal        Testing/Diagnostic: BMP, CBC, staph/MRSA    Patient Specialist/PCP: Annemarie Calloway MD (PCP) 5/3/2024; Jb Mon MD (urology) 5/23/2024    Visit Vitals  Pulse 64   Temp 35.5 °C (95.9 °F) (Temporal)   Resp 20   Ht 1.727 m (5' 8\")   Wt 78.7 kg (173 lb 8 oz)   SpO2 98%   BMI 26.38 kg/m²   Smoking Status Never   BSA 1.94 m²       DASI Risk Score      Flowsheet Row Pre-Admission Testing [facility] 12/13/2024 in [facility]   Can you take care of yourself (eat, dress, bathe, or use toilet)?  2.75 filed at 12/13/2024 0748   Can you walk indoors, such as around your house? 1.75 filed at 12/13/2024 0748   Can you walk a block or two on level ground?  2.75 filed at 12/13/2024 0748   Can you climb a flight of stairs or walk up a hill? 5.5 filed at 12/13/2024 0748   Can you run a short distance? 0 filed at 12/13/2024 0748   Can you do light work around the house like dusting or washing dishes? 2.7 filed at 12/13/2024 0748   Can you do moderate work around the house like vacuuming, sweeping floors or carrying groceries? 3.5 filed at 12/13/2024 0748   Can you do heavy work around the house like scrubbing floors or lifting and moving heavy furniture?  0 filed at 12/13/2024 0748   Can you do yard work like raking leaves, weeding or pushing a mower? 0 filed at 12/13/2024 0748   Can you have sexual relations? 5.25 filed at 12/13/2024 0748   Can you participate in moderate recreational activities like golf, bowling, dancing, doubles tennis or throwing a baseball or football? 0 filed at 12/13/2024 0748          Caprini DVT Assessment    No data to display       Modified Frailty Index    No data to display       CHADS2 Stroke Risk  Current as of 24 minutes ago        N/A 3 to 100%: High Risk   2 to < 3%: Medium Risk   0 to < 2%: Low Risk     Last Change: N/A          This score determines the patient's risk of having a stroke if the patient has atrial fibrillation.        This score is not applicable " to this patient. Components are not calculated.          Revised Cardiac Risk Index    No data to display       Apfel Simplified Score    No data to display       Risk Analysis Index Results This Encounter    No data found in the last 10 encounters.       Stop Bang Score      Flowsheet Row Pre-Admission Testing [facility] 12/13/2024 in [facility]   Do you snore loudly? 0 filed at 12/13/2024 0748   Do you often feel tired or fatigued after your sleep? 0 filed at 12/13/2024 0748   Has anyone ever observed you stop breathing in your sleep? 0 filed at 12/13/2024 0748   Do you have or are you being treated for high blood pressure? 0 filed at 12/13/2024 0748   Recent BMI (Calculated) 26.4 filed at 12/13/2024 0748   Is BMI greater than 35 kg/m2? 0=No filed at 12/13/2024 0748   Age older than 50 years old? 0=No filed at 12/13/2024 0748   Is your neck circumference greater than 17 inches (Male) or 16 inches (Female)? 0 filed at 12/13/2024 0748   Gender - Male 1=Yes filed at 12/13/2024 0748   STOP-BANG Total Score 1 filed at 12/13/2024 0748          Prodigy: High Risk  Total Score: 8              Prodigy Gender Score          ARISCAT Score for Postoperative Pulmonary Complications    No data to display       Salas Perioperative Risk for Myocardial Infarction or Cardiac Arrest (LAZARA)    No data to display         Assessment and Plan:     # Elevated PSA - referred to urology by PCP note  # Left patellar tendon strain - Left knee arthroscopy w/partial lateral meniscectomy, Left knee patella tendon repair w/Dr Buck on 12/20/2024    Medical hx, Allergies, VS and Labs reviewed  Medications addressed w/pre-op instructions provided         DC instructions

## 2024-12-15 LAB — STAPHYLOCOCCUS SPEC CULT: NORMAL

## 2024-12-26 ENCOUNTER — APPOINTMENT (OUTPATIENT)
Dept: ORTHOPEDIC SURGERY | Facility: CLINIC | Age: 49
End: 2024-12-26
Payer: COMMERCIAL

## 2025-01-14 ENCOUNTER — HOSPITAL ENCOUNTER (OUTPATIENT)
Dept: RADIOLOGY | Facility: EXTERNAL LOCATION | Age: 50
Discharge: HOME | End: 2025-01-14

## 2025-01-14 ENCOUNTER — HOSPITAL ENCOUNTER (OUTPATIENT)
Dept: RADIOLOGY | Facility: CLINIC | Age: 50
Discharge: HOME | End: 2025-01-14
Payer: COMMERCIAL

## 2025-01-14 ENCOUNTER — OFFICE VISIT (OUTPATIENT)
Dept: ORTHOPEDIC SURGERY | Facility: CLINIC | Age: 50
End: 2025-01-14
Payer: COMMERCIAL

## 2025-01-14 VITALS — WEIGHT: 165 LBS | BODY MASS INDEX: 24.44 KG/M2 | HEIGHT: 69 IN

## 2025-01-14 DIAGNOSIS — M77.12 LATERAL EPICONDYLITIS OF LEFT ELBOW: Primary | ICD-10-CM

## 2025-01-14 DIAGNOSIS — M25.521 RIGHT ELBOW PAIN: ICD-10-CM

## 2025-01-14 DIAGNOSIS — M25.522 LEFT ELBOW PAIN: ICD-10-CM

## 2025-01-14 DIAGNOSIS — M77.11 LATERAL EPICONDYLITIS OF RIGHT ELBOW: ICD-10-CM

## 2025-01-14 DIAGNOSIS — M25.562 ACUTE PAIN OF LEFT KNEE: ICD-10-CM

## 2025-01-14 PROCEDURE — 76942 ECHO GUIDE FOR BIOPSY: CPT | Performed by: FAMILY MEDICINE

## 2025-01-14 PROCEDURE — 99214 OFFICE O/P EST MOD 30 MIN: CPT | Performed by: FAMILY MEDICINE

## 2025-01-14 PROCEDURE — 20551 NJX 1 TENDON ORIGIN/INSJ: CPT | Performed by: FAMILY MEDICINE

## 2025-01-14 PROCEDURE — 3008F BODY MASS INDEX DOCD: CPT | Performed by: FAMILY MEDICINE

## 2025-01-14 PROCEDURE — 1036F TOBACCO NON-USER: CPT | Performed by: FAMILY MEDICINE

## 2025-01-14 RX ORDER — TRIAMCINOLONE ACETONIDE 40 MG/ML
20 INJECTION, SUSPENSION INTRA-ARTICULAR; INTRAMUSCULAR
Status: COMPLETED | OUTPATIENT
Start: 2025-01-14 | End: 2025-01-14

## 2025-01-14 RX ORDER — LIDOCAINE HYDROCHLORIDE 20 MG/ML
0.5 INJECTION, SOLUTION INFILTRATION; PERINEURAL
Status: COMPLETED | OUTPATIENT
Start: 2025-01-14 | End: 2025-01-14

## 2025-01-14 RX ADMIN — TRIAMCINOLONE ACETONIDE 20 MG: 40 INJECTION, SUSPENSION INTRA-ARTICULAR; INTRAMUSCULAR at 12:48

## 2025-01-14 RX ADMIN — LIDOCAINE HYDROCHLORIDE 0.5 ML: 20 INJECTION, SOLUTION INFILTRATION; PERINEURAL at 12:48

## 2025-01-14 NOTE — PROGRESS NOTES
History of Present Illness   Chief Complaint   Patient presents with    Left Elbow - Pain     Nki  Pain lateral side x 12/2024  -n/t -swelling     Right Elbow - Pain     Nki  Pain lateral side x 12/2024  -n/t -swelling        The patient is 49 y.o. male  here with a complaint of bilateral elbow pain, somewhat of a chronic issue for many years, feels like symptoms have worsened over the past month or so, left greater than right.  Patient points to the lateral elbow as area of discomfort in both elbows.  He does have pain with day-to-day activity, he enjoys skiing which has been difficult.  He has been using a counterforce brace on the left elbow with minimal change, he takes over-the-counter medication sparingly.  Patient says that he does believe he had an injection in his left elbow several years ago that was somewhat beneficial but has still had issues with pain ongoing for he thinks at least 7 years on the left elbow, not as long on the right but feels like this has been chronic as well but much more mild and tolerable.  He denies any radiation of pain down the forearm, he denies any numbness or tingling.    No past medical history on file.    Medication Documentation Review Audit       Reviewed by Valery Nelson RN (Registered Nurse) on 12/13/24 at 0807      Medication Order Taking? Sig Documenting Provider Last Dose Status   chlorhexidine (Hibiclens) 4 % external liquid [id number] No Apply topically once daily for 5 days. Wash daily for 5 days prior to surgery with day 5 being morning of surgery. ROBERT Nair-CNP Unknown Active   chlorhexidine (Peridex) 0.12 % solution [id number] No Use 15 mL in the mouth or throat once daily for 2 doses. Swish and Spit day before surgery and again morning on day of surgery. ROBERT Nair-CNP Unknown Active   multivitamin tablet [id number] Yes Take 1 tablet by mouth once daily. Historical Provider, MD Past Week Active                    Allergies    Allergen Reactions    Codeine Nausea And Vomiting, Nausea Only and Hives     nausea       Social History     Socioeconomic History    Marital status:      Spouse name: Not on file    Number of children: Not on file    Years of education: Not on file    Highest education level: Not on file   Occupational History    Not on file   Tobacco Use    Smoking status: Never    Smokeless tobacco: Never   Substance and Sexual Activity    Alcohol use: Yes     Alcohol/week: 1.0 standard drink of alcohol     Types: 1 Cans of beer per week     Comment: 4-5 beers a week.    Drug use: Never    Sexual activity: Not on file   Other Topics Concern    Not on file   Social History Narrative    Not on file     Social Drivers of Health     Financial Resource Strain: Low Risk  (9/11/2023)    Received from [employer]    Overall Financial Resource Strain (CARDIA)     Difficulty of Paying Living Expenses: Not hard at all   Food Insecurity: No Food Insecurity (9/11/2023)    Received from [employer]    Hunger Vital Sign     Worried About Running Out of Food in the Last Year: Never true     Ran Out of Food in the Last Year: Never true   Transportation Needs: No Transportation Needs (9/11/2023)    Received from [employer]    PRAPARE - Transportation     Lack of Transportation (Medical): No     Lack of Transportation (Non-Medical): No   Physical Activity: Sufficiently Active (9/11/2023)    Received from [employer]    Exercise Vital Sign     Days of Exercise per Week: 7 days     Minutes of Exercise per Session: 60 min   Stress: No Stress Concern Present (9/11/2023)    Received from [employer]    [employer] - Occupational Stress Questionnaire     Feeling of Stress : Only a little   Social Connections: Socially Integrated (9/11/2023)    Received from [employer]    Social Connection and Isolation Panel [NHANES]     Frequency of  Communication with Friends and Family: More than three times a week     Frequency of Social Gatherings with Friends and Family: Once a week     Attends Buddhist Services: More than 4 times per year     Active Member of Clubs or Organizations: Yes     Attends Club or Organization Meetings: More than 4 times per year     Marital Status:    Intimate Partner Violence: Not At Risk (9/11/2023)    Received from [employer]    Humiliation, Afraid, Rape, and Kick questionnaire     Fear of Current or Ex-Partner: No     Emotionally Abused: No     Physically Abused: No     Sexually Abused: No   Housing Stability: Unknown (9/11/2023)    Received from "[employer]    Housing Stability Vital Sign     Unable to Pay for Housing in the Last Year: No     Number of Places Lived in the Last Year: Not on file     Unstable Housing in the Last Year: No       Past Surgical History:   Procedure Laterality Date    HERNIA REPAIR  2000    KNEE CARTILAGE SURGERY Right 2019    TONSILLECTOMY            Review of Systems   GENERAL: Negative  GI: Negative  MUSCULOSKELETAL: See HPI  SKIN: Negative  NEURO:  Negative     Physical Exam:    General/Constitutional: well appearing, no distress, appears stated age  HEENT: sclera clear  Respiratory: non labored breathing  Vascular: No edema, swelling or tenderness, except as noted in detailed exam.  Integumentary: No impressive skin lesions present, except as noted in detailed exam.  Neurological:  Alert and oriented   Psychological:  Normal mood and affect.  Musculoskeletal: Normal, except as noted in detailed exam and in HPI    Left elbow: Normal appearance, no swelling, no skin changes.  He is focally tender at the common extensor tendon origin at the lateral epicondyle.  No other areas of tenderness to palpation.  He has good range of motion at the elbow.  No motor deficits present at the elbow.  There is exacerbation of lateral elbow pain with resisted wrist and third  finger extension.  Left upper extremity is neurovascular intact    Right elbow: Normal appearance, no swelling, no skin changes, tenderness at common extensor tendon origin at the lateral epicondyle, no other areas of tenderness to palpation.  No motor deficits are present at the elbow, there is some exacerbation of right elbow pain with resisted wrist and third finger extension, less so compared to the left.       Imaging no x-rays today, MSK ultrasound done for injection in the left elbow did show some calcific tendinitis of the common extensor tendon.    Hand / UE Inj/Asp: L elbow for lateral epicondylitis on 1/14/2025 12:48 PM  Indications: pain  Details: 22 G needle, ultrasound-guided  Medications: 20 mg triamcinolone acetonide 40 mg/mL; 0.5 mL lidocaine 20 mg/mL (2 %)  Outcome: tolerated well, no immediate complications    Left common extensor tendon and surrounding structures were appropriately visualized before and during injection    Ultrasound images were permanently uploaded to the medical record/PACS  Procedure, treatment alternatives, risks and benefits explained, specific risks discussed. Consent was given by the patient. Immediately prior to procedure a time out was called to verify the correct patient, procedure, equipment, support staff and site/side marked as required. Patient was prepped and draped in the usual sterile fashion.               Assessment   1. Lateral epicondylitis of left elbow        2. Lateral epicondylitis of right elbow        3. Left elbow pain  XR elbow 1-2 views bilateral    XR elbow 1-2 views bilateral    Point of Care Ultrasound      4. Right elbow pain  XR elbow 1-2 views bilateral    XR elbow 1-2 views bilateral            Plan: Bilateral elbow pain consistent with lateral epicondylitis, there is some evidence of calcific tenderness of the left elbow common extensor tendon.  Left elbow symptoms are worse than the right.  We discussed further workup and treatment.  He is  looking for more potential definitive management.  We discussed consideration of percutaneous tenotomy procedure which he was interested in but would like to wait until the ski season is over, we did proceed with left elbow common extensor  peritenon injection given degree of discomfort in his left elbow currently, but this will provide some relief.  He is contemplating left elbow procedure sometime in April, he will follow-up prior to this.  If right elbow symptoms worsen could consider right elbow corticosteroid injection.   Finasteride Counseling:  I discussed with the patient the risks of use of finasteride including but not limited to decreased libido, decreased ejaculate volume, gynecomastia, and depression. Women should not handle medication.  All of the patient's questions and concerns were addressed. Finasteride Male Counseling: Finasteride Counseling:  I discussed with the patient the risks of use of finasteride including but not limited to decreased libido, decreased ejaculate volume, gynecomastia, and depression. Women should not handle medication.  All of the patient's questions and concerns were addressed.

## 2025-03-04 ENCOUNTER — OFFICE VISIT (OUTPATIENT)
Dept: ORTHOPEDIC SURGERY | Facility: CLINIC | Age: 50
End: 2025-03-04
Payer: COMMERCIAL

## 2025-03-04 ENCOUNTER — HOSPITAL ENCOUNTER (OUTPATIENT)
Dept: RADIOLOGY | Facility: EXTERNAL LOCATION | Age: 50
Discharge: HOME | End: 2025-03-04

## 2025-03-04 VITALS — BODY MASS INDEX: 24.44 KG/M2 | WEIGHT: 165 LBS | HEIGHT: 69 IN

## 2025-03-04 DIAGNOSIS — M77.11 LATERAL EPICONDYLITIS OF RIGHT ELBOW: Primary | ICD-10-CM

## 2025-03-04 DIAGNOSIS — M25.521 RIGHT ELBOW PAIN: ICD-10-CM

## 2025-03-04 DIAGNOSIS — M77.12 LATERAL EPICONDYLITIS OF LEFT ELBOW: ICD-10-CM

## 2025-03-04 PROCEDURE — 3008F BODY MASS INDEX DOCD: CPT | Performed by: FAMILY MEDICINE

## 2025-03-04 PROCEDURE — 1036F TOBACCO NON-USER: CPT | Performed by: FAMILY MEDICINE

## 2025-03-04 PROCEDURE — 99214 OFFICE O/P EST MOD 30 MIN: CPT | Performed by: FAMILY MEDICINE

## 2025-03-04 NOTE — PROGRESS NOTES
History of Present Illness   Chief Complaint   Patient presents with    Right Elbow - Follow-up       The patient is 49 y.o. male  here for follow-up of bilateral elbow pain.  Patient was seen by me a little less than 2 months ago for this issue.  See previous note for full details.  In brief patient had been dealing with some chronic elbow pain consistent with lateral epicondylitis, had had prior elbow injections years ago that provided some short-term relief, had been doing some dry needling with chiropractor as well.  At his initial visit with me his left elbow was more bothersome than the right, we did proceed with left elbow Kenalog injection, he was given a home exercise rehabilitation program to begin.  We discussed consideration of percutaneous tenotomy procedure for potentially more definitive management.  He says that today his left elbow is doing well, he denies any residual pain, his right elbow is still bothering him with day-to-day activity at the lateral elbow.  He is thinking that he would like to proceed with the percutaneous tenotomy procedure for his right elbow.  He denies any new injuries or symptoms.  He denies any radiation of pain down the forearm, he denies any numbness or tingling.    No past medical history on file.    Medication Documentation Review Audit       Reviewed by Art Rolle MD (Physician) on 01/14/25 at 1249      Medication Order Taking? Sig Documenting Provider Last Dose Status   multivitamin tablet [id number] No Take 1 tablet by mouth once daily. Historical Provider, MD Past Week Active                    Allergies   Allergen Reactions    Codeine Nausea And Vomiting, Nausea Only and Hives     nausea       Social History     Socioeconomic History    Marital status:      Spouse name: Not on file    Number of children: Not on file    Years of education: Not on file    Highest education level: Not on file   Occupational History    Not on file   Tobacco Use    Smoking  status: Never    Smokeless tobacco: Never   Substance and Sexual Activity    Alcohol use: Yes     Alcohol/week: 1.0 standard drink of alcohol     Types: 1 Cans of beer per week     Comment: 4-5 beers a week.    Drug use: Never    Sexual activity: Not on file   Other Topics Concern    Not on file   Social History Narrative    Not on file     Social Drivers of Health     Financial Resource Strain: Low Risk  (9/11/2023)    Received from [employer]    Overall Financial Resource Strain (CARDIA)     Difficulty of Paying Living Expenses: Not hard at all   Food Insecurity: No Food Insecurity (9/11/2023)    Received from [employer]    Hunger Vital Sign     Worried About Running Out of Food in the Last Year: Never true     Ran Out of Food in the Last Year: Never true   Transportation Needs: No Transportation Needs (9/11/2023)    Received from [employer]    PRAPARE - Transportation     Lack of Transportation (Medical): No     Lack of Transportation (Non-Medical): No   Physical Activity: Sufficiently Active (9/11/2023)    Received from [employer]    Exercise Vital Sign     Days of Exercise per Week: 7 days     Minutes of Exercise per Session: 60 min   Stress: No Stress Concern Present (9/11/2023)    Received from [employer]    [employer]ational [employer] - Occupational Stress Questionnaire     Feeling of Stress : Only a little   Social Connections: Socially Integrated (9/11/2023)    Received from [employer]    Social Connection and Isolation Panel [NHANES]     Frequency of Communication with Friends and Family: More than three times a week     Frequency of Social Gatherings with Friends and Family: Once a week     Attends Lutheran Services: More than 4 times per year     Active Member of Clubs or Organizations: Yes     Attends Club or Organization Meetings: More than 4 times per year     Marital Status:    Intimate Partner  Violence: Not At Risk (9/11/2023)    Received from [employer]    Humiliation, Afraid, Rape, and Kick questionnaire     Fear of Current or Ex-Partner: No     Emotionally Abused: No     Physically Abused: No     Sexually Abused: No   Housing Stability: Unknown (9/11/2023)    [facility] [employer] [employer]    Housing Stability Vital Sign     Unable to Pay for Housing in the Last Year: No     Number of Places Lived in the Last Year: Not on file     Unstable Housing in the Last Year: No       Past Surgical History:   Procedure Laterality Date    HERNIA REPAIR  2000    KNEE CARTILAGE SURGERY Right 2019    TONSILLECTOMY            Review of Systems   GENERAL: Negative  GI: Negative  MUSCULOSKELETAL: See HPI  SKIN: Negative  NEURO:  Negative     Physical Exam:    General/Constitutional: well appearing, no distress, appears stated age  HEENT: sclera clear  Respiratory: non labored breathing  Vascular: No edema, swelling or tenderness, except as noted in detailed exam.  Integumentary: No impressive skin lesions present, except as noted in detailed exam.  Neurological:  Alert and oriented   Psychological:  Normal mood and affect.  Musculoskeletal: Normal, except as noted in detailed exam and in HPI    Left elbow: Normal appearance, no swelling, no skin changes.  Nontender at lateral epicondyle/common extensor tendon origin.  No other areas of tenderness to palpation.  He has good range of motion at the elbow.  No motor deficits present at the elbow.  No elbow pain with resisted wrist and third finger extension.  Left upper extremity is neurovascular intact    Right elbow: Normal appearance, no swelling, no skin changes, tenderness at common extensor tendon origin at the lateral epicondyle, no other areas of tenderness to palpation.  No motor deficits are present at the elbow, there is some exacerbation of right elbow pain with resisted wrist and third finger extension, less so compared to the left.        Imaging: Limited MSK ultrasound of right elbow shows some tendinopathy of the common extensor tendon origin at the lateral condyle.          Assessment   1. Lateral epicondylitis of right elbow        2. Lateral epicondylitis of left elbow        3. Right elbow pain  Point of Care Ultrasound            Plan: Discussed further workup and treatment with patient.  Left elbow is currently doing well following Kenalog injection 6 weeks ago, still having symptoms in the right elbow.  Discussed treatment options including possible right elbow Kenalog injection versus consideration of potential more definitive management with percutaneous tenotomy procedure.  Patient was interested in pursuing this in April.  We discussed risks and benefits of procedure including bleeding, infection, damage surrounding structures as well as ongoing pain despite procedure.  Patient in agreement with plan to proceed.  He will call surgery scheduling tomorrow to schedule this procedure for April.  We discussed postprocedure timeline/recovery.  All their questions and concerns were answered

## 2025-04-10 ENCOUNTER — HOSPITAL ENCOUNTER (OUTPATIENT)
Dept: RADIOLOGY | Facility: EXTERNAL LOCATION | Age: 50
Discharge: HOME | End: 2025-04-10

## 2025-04-10 ENCOUNTER — HOSPITAL ENCOUNTER (OUTPATIENT)
Dept: PAIN MEDICINE | Facility: CLINIC | Age: 50
Discharge: HOME | End: 2025-04-10
Payer: COMMERCIAL

## 2025-04-10 VITALS
RESPIRATION RATE: 16 BRPM | TEMPERATURE: 98.1 F | HEART RATE: 75 BPM | DIASTOLIC BLOOD PRESSURE: 90 MMHG | SYSTOLIC BLOOD PRESSURE: 131 MMHG

## 2025-04-10 DIAGNOSIS — M77.11 LATERAL EPICONDYLITIS OF RIGHT ELBOW: ICD-10-CM

## 2025-04-10 PROCEDURE — 2720000007 HC OR 272 NO HCPCS

## 2025-04-10 PROCEDURE — 24357 REPAIR ELBOW PERC: CPT | Performed by: FAMILY MEDICINE

## 2025-04-10 PROCEDURE — 76942 ECHO GUIDE FOR BIOPSY: CPT | Performed by: FAMILY MEDICINE

## 2025-04-10 PROCEDURE — 2500000004 HC RX 250 GENERAL PHARMACY W/ HCPCS (ALT 636 FOR OP/ED): Performed by: FAMILY MEDICINE

## 2025-04-10 RX ORDER — OMEPRAZOLE 20 MG/1
20 TABLET, DELAYED RELEASE ORAL
COMMUNITY

## 2025-04-10 RX ORDER — LIDOCAINE HYDROCHLORIDE 10 MG/ML
INJECTION, SOLUTION EPIDURAL; INFILTRATION; INTRACAUDAL; PERINEURAL AS NEEDED
Status: COMPLETED | OUTPATIENT
Start: 2025-04-10 | End: 2025-04-10

## 2025-04-10 RX ORDER — BUPIVACAINE HYDROCHLORIDE 2.5 MG/ML
INJECTION, SOLUTION EPIDURAL; INFILTRATION; INTRACAUDAL; PERINEURAL AS NEEDED
Status: COMPLETED | OUTPATIENT
Start: 2025-04-10 | End: 2025-04-10

## 2025-04-10 RX ADMIN — BUPIVACAINE HYDROCHLORIDE 5 ML: 2.5 INJECTION, SOLUTION EPIDURAL; INFILTRATION; INTRACAUDAL; PERINEURAL at 13:06

## 2025-04-10 RX ADMIN — LIDOCAINE HYDROCHLORIDE 5 ML: 10 INJECTION, SOLUTION EPIDURAL; INFILTRATION; INTRACAUDAL; PERINEURAL at 13:06

## 2025-04-10 ASSESSMENT — PAIN - FUNCTIONAL ASSESSMENT
PAIN_FUNCTIONAL_ASSESSMENT: 0-10
PAIN_FUNCTIONAL_ASSESSMENT: 0-10

## 2025-04-10 ASSESSMENT — PAIN SCALES - GENERAL
PAINLEVEL_OUTOF10: 0 - NO PAIN
PAINLEVEL_OUTOF10: 0 - NO PAIN

## 2025-04-10 NOTE — H&P
History Of Present Illness  Michael Tellez is a 49 y.o. male presenting with right elbow pain/chronic lateral condyle lightest.  He has pain over the right lateral elbow, he has had prior Kenalog injection with relief but recurrence of pain, he has done formal therapy.  Pain over the lateral elbow, no numbness or tingling. Pain exacerbated by lifting things, day-to-day activity, better with rest. He has an ultrasound consistent with tendinopathy of the common extensor tendon.  Patient interested in pursuing percutaneous tenotomy procedure today     Past Medical History  History reviewed. No pertinent past medical history.    Surgical History  Past Surgical History:   Procedure Laterality Date    HERNIA REPAIR  2000    KNEE CARTILAGE SURGERY Right 2019    TONSILLECTOMY          Social History  He reports that he has never smoked. He has never used smokeless tobacco. He reports current alcohol use of about 1.0 standard drink of alcohol per week. He reports that he does not use drugs.    Family History  Family History   Problem Relation Name Age of Onset    Breast cancer Mother      Prostate cancer Father      Leukemia Father      Prostate cancer Brother      Heart attack Maternal Grandfather          In his 60s    Heart attack Paternal Grandfather          Age 40        Allergies  Codeine    Review of Systems     Constitutional: Denies fevers, chills, weight loss/gain  Eyes: Denies visual changes  ENT: Denies hearing loss  CV: Denies chest pain, LE edema  Resp: Denies breathing difficulties  GI: Denies abominal pain  MSK: as per HPI  Integumentary: Denies any rash or skin lesions  Neuro: Denies any numbness, tingling, weakness    A comprehensive 14 point ROS was performed, reviewed, and the pertinent orthopedic findings are documented in the HPI        Physical Exam    General/Constitutional: well appearing, no distress, appears stated age  HEENT: sclera clear  Respiratory: non labored breathing  Vascular: No edema,  swelling or tenderness, except as noted in detailed exam.  Integumentary: No impressive skin lesions present, except as noted in detailed exam.  Neurological:  Alert and oriented   Psychological:  Normal mood and affect.  Musculoskeletal: Normal, except as noted in detailed exam and in HPI     Last Recorded Vitals  Blood pressure 131/90, pulse 75, temperature 36.7 °C (98.1 °F), resp. rate 16.    Relevant Results             Assessment/Plan   Assessment & Plan  Lateral epicondylitis of right elbow           Patient with ongoing right elbow pain consistent with chronic lateral epicondylitis/common extensor tendinopathy.  He has tried conservative treatment with ongoing pain, he is here today for percutaneous tenotomy procedure with [employer], he understands the risks and benefits of procedure including potential for ongoing pain despite procedure, consent has been signed today.  All questions and concerns were answered.      Art Rolle MD

## 2025-04-10 NOTE — PROCEDURES
Tenotomy    Date/Time: 4/10/2025 1:22 PM    Performed by: Art Rolle MD  Authorized by: Art Rolle MD    Consent:     Consent obtained:  Written    Consent given by:  Patient    Risks, benefits, and alternatives were discussed: yes      Risks discussed:  Bleeding, infection and pain    Alternatives discussed:  No treatment, delayed treatment, alternative treatment, observation and referral  Universal protocol:     Procedure explained and questions answered to patient or proxy's satisfaction: yes      Relevant documents present and verified: yes      Test results available: yes      Imaging studies available: yes      Required blood products, implants, devices, and special equipment available: yes      Site/side marked: yes      Immediately prior to procedure, a time out was called: yes      Patient identity confirmed:  Verbally with patient  Indications:     Indications:  Right elbow lateral epicondylitis  Pre-procedure details:     Skin preparation:  Chlorhexidine    Preparation: Patient was prepped and draped in the usual sterile fashion    Sedation:     Sedation type:  None  Anesthesia:     Anesthesia method:  Local infiltration    Local anesthetic:  Bupivacaine 0.25% w/o epi and lidocaine 1% w/o epi  Procedure specific details:      PROCEDURE PERFORMED:  Percutaneous tenotomy of the right common extensor tendon using ultrasound guidance to cut and remove the patient's pathologic tissue, tendon or fascia.   DESCRIPTION OF PROCEDURE:   A sterile sleeve was placed over the ultrasound transducer. The anatomy was identified and the diseased tissue was visualized or confirmed at the common extensor tendon near the insertion. The area was prepped with an antimicrobial solution and the area was injected with 7ml of a 50/50 mixture of 1% Lidocaine and 0.25% bupivacaine using a 25 gauge needle. A skin wheal was placed and the involved tissue was anesthetized. A #11 blade was used to incise through the skin wheal about  1.5 cm distal to the site of maximum tenderness. The blade continued through the subcutaneous tissue and incised the tendon/fascia.  To section the common extensor tendon the surgical instrument is introduced through the incision advancing to the diseased tendon. Once the tip of instrument confirmed the pathologic tissue, the foot pedal was depressed and the tendon is incised along its length cutting and removing the diseased tendon tissue.   Following the procedure, steri-strips were placed, followed by Tegaderm. Patient was then placed in a sling  Post-op instructions given: Keep dressing clean and dry over the next week, take OTC pain meds as needed for pain,  follow-up in my office in 1 week.   Post-procedure details:     Procedure completion:  Tolerated well, no immediate complications

## 2025-04-10 NOTE — DISCHARGE INSTRUCTIONS
Discharge instructions and hand out given and verbally reviewed with patient and Dr Rolle, patient verbalize understanding.  Dsd intact

## 2025-04-11 ENCOUNTER — OFFICE VISIT (OUTPATIENT)
Dept: GASTROENTEROLOGY | Facility: CLINIC | Age: 50
End: 2025-04-11
Payer: COMMERCIAL

## 2025-04-11 VITALS
SYSTOLIC BLOOD PRESSURE: 137 MMHG | HEIGHT: 69 IN | HEART RATE: 70 BPM | BODY MASS INDEX: 24.88 KG/M2 | DIASTOLIC BLOOD PRESSURE: 88 MMHG | WEIGHT: 168 LBS

## 2025-04-11 DIAGNOSIS — K58.9 IRRITABLE BOWEL SYNDROME, UNSPECIFIED TYPE: ICD-10-CM

## 2025-04-11 DIAGNOSIS — R10.9 ABDOMINAL DISCOMFORT: ICD-10-CM

## 2025-04-11 DIAGNOSIS — D12.6 COLON ADENOMA: ICD-10-CM

## 2025-04-11 DIAGNOSIS — R19.5 LOOSE STOOLS: ICD-10-CM

## 2025-04-11 DIAGNOSIS — R14.0 ABDOMINAL BLOATING: ICD-10-CM

## 2025-04-11 DIAGNOSIS — R10.13 DYSPEPSIA: Primary | ICD-10-CM

## 2025-04-11 DIAGNOSIS — K57.90 DIVERTICULOSIS: ICD-10-CM

## 2025-04-11 PROCEDURE — 3008F BODY MASS INDEX DOCD: CPT | Performed by: STUDENT IN AN ORGANIZED HEALTH CARE EDUCATION/TRAINING PROGRAM

## 2025-04-11 PROCEDURE — 99205 OFFICE O/P NEW HI 60 MIN: CPT | Performed by: STUDENT IN AN ORGANIZED HEALTH CARE EDUCATION/TRAINING PROGRAM

## 2025-04-11 PROCEDURE — 1036F TOBACCO NON-USER: CPT | Performed by: STUDENT IN AN ORGANIZED HEALTH CARE EDUCATION/TRAINING PROGRAM

## 2025-04-11 RX ORDER — OMEPRAZOLE 40 MG/1
40 CAPSULE, DELAYED RELEASE ORAL DAILY
Qty: 30 CAPSULE | Refills: 11 | Status: SHIPPED | OUTPATIENT
Start: 2025-04-11 | End: 2026-04-11

## 2025-04-11 RX ORDER — ONDANSETRON HYDROCHLORIDE 2 MG/ML
4 INJECTION, SOLUTION INTRAVENOUS ONCE AS NEEDED
OUTPATIENT
Start: 2025-04-11

## 2025-04-11 NOTE — PATIENT INSTRUCTIONS
1-we need to schedule for an upper endoscopy.  Please hold omeprazole for the last 2 weeks before your procedure  2-we need to check some blood and stool tests  3-radiology will call you to help you schedule an x-ray of your bowel  4-increase water intake, fiber intake and physical activity as tolerated

## 2025-04-11 NOTE — PROGRESS NOTES
49-year-old gentleman with history of chronic lateral condylitis status post recent elbow surgery, hernia repair, tonsillectomy presenting with worsening symptoms of abdominal discomfort and indigestion, diffuse, mainly worsened after milk spices coffee ipa beer intake, partially better by omeprazole Gas-X and having bowel movements.  Patient mentioned that he has recurrent episodes of soft stools and multiple bowel movements per day whenever he has flareups, not at night with partial relief of his symptoms.  He feels that he empties completely, denies having any back-to-back bowel movements.    EGD 10/2022 2 cm hiatal hernia, biopsy of the stomach unremarkable  Fit test - 8/2020  Colonoscopy 10/2022 left diverticulosis, good prep, cecal and transverse TA, repeat in 7 years  Brother and father have prostate cancer, paternal grandmother had colon cancer at around 60 years of age  Normally 1-2 bowel movements per day, [place] 4-6, flareups can manifest as multiple bowel movements  Denies NSAIDs alcohol marijuana drug use smoking               The note was created using voice recognition transcription software. Despite proofreading, unintentional typographical errors may be present. Please contact the GI office with any questions or concerns.     Current Medications: reviewed    A 10 point review of system is negative except for what is mentioned in the HPI      Follow up with primary provider was advised for non GI symptoms and findings    Follow up with GI was advised     Vital Signs: Reviewed    Physical Exam:  General: no apparent distress, pleasant and cooperative  Skin:  Warm and dry, no jaundice  HEENT: No scleral icterus, no conjunctival pallor, normocephalic, atraumatic, mucous membranes moist  Neck:  atraumatic, trachea midline, no JVD  Chest:  decreased air entry to auscultation bilaterally. No wheezes, rales, or rhonchi  CV:  Regular rate and rhythm.  Positive S1/S2  Abdomen: no distension, +BS, soft,  non-tender to palpation, no rebound tenderness, no guarding, no rigidity, no discernible ascites   Extremities: lower extremity edema, Chronic pigmentary changes, no cyanosis  Neurological:  A&Ox3 , no asterixis  Psychiatric: cooperative     Investigations:  Labs, radiological imaging and cardiac work up were reviewed    1-hepatitis C antibody - 9/2022    2-BMI 24, healthy lifestyle advised    3-Healthcare maintenance, colonoscopy as above, repeat in 2029 unless indicated otherwise    4-previous , ALT 19 4/2013, reported mononucleosis as per patient, currently LFTs normal, normal liver on CAT scan 10/2014 and ultrasound for 2013    Ferritin 96, alpha 1 antitrypsin 97 low, negative HBV HCV KETAN serum plasmin    5-chronic recurrent indigestion abdominal discomfort described as above, likely multifactorial  *Will need to rule out peptic ulcer disease, will check EGD with biopsies  *Possible component of IBS, check KUB, will consider Metamucil and Gas-X as patient had partial previous relief with Gas-X

## 2025-04-16 LAB
CRP SERPL-MCNC: <3 MG/L
IGA SERPL-MCNC: 184 MG/DL (ref 47–310)
TSH SERPL-ACNC: 1.89 MIU/L (ref 0.4–4.5)
TTG IGA SER-ACNC: <1 U/ML

## 2025-04-17 ENCOUNTER — APPOINTMENT (OUTPATIENT)
Dept: ORTHOPEDIC SURGERY | Facility: CLINIC | Age: 50
End: 2025-04-17
Payer: COMMERCIAL

## 2025-04-17 DIAGNOSIS — M77.11 LATERAL EPICONDYLITIS OF RIGHT ELBOW: Primary | ICD-10-CM

## 2025-04-17 PROCEDURE — 99024 POSTOP FOLLOW-UP VISIT: CPT | Performed by: FAMILY MEDICINE

## 2025-04-17 PROCEDURE — 1036F TOBACCO NON-USER: CPT | Performed by: FAMILY MEDICINE

## 2025-04-17 NOTE — PROGRESS NOTES
History of Present Illness   Chief Complaint   Patient presents with    Right Elbow - Post-op     Tenotomy  DOS:4/10/25       The patient is 49 y.o. male here for 1 week follow up status post Tenjet procedure for treatment of right lateral epicondylitis, DOS 4/10/25.  Patient is doing well, he denies any concern about infection at incision site, having some soreness at the elbow as expected.  He has been compliant with post procedure restrictions.  He denies any new injuries or symptoms.    Medical History[1]    Medication Documentation Review Audit       Reviewed by Davian Fitch MD (Physician) on 25 at 0817      Medication Order Taking? Sig Documenting Provider Last Dose Status   bupivacaine PF 0.25 % (Marcaine) 0.25 % (2.5 mg/mL) injection [id number]   Art Rolle MD   04/10/25 1306   lidocaine PF (Xylocaine) 10 mg/mL (1 %) injection [id number]   Art Rolle MD   04/10/25 1306   multivitamin tablet [id number] Yes Take 1 tablet by mouth once daily. Historical Provider, MD 2025 Active   omeprazole OTC (PriLOSEC OTC) 20 mg EC tablet [id number] Yes Take 1 tablet (20 mg) by mouth once daily in the morning. Take before meals. Do not crush, chew, or split. Historical Provider, MD 4/10/2025 Active                    RX Allergies[2]    Social History     Socioeconomic History    Marital status:      Spouse name: Not on file    Number of children: Not on file    Years of education: Not on file    Highest education level: Not on file   Occupational History    Not on file   Tobacco Use    Smoking status: Never    Smokeless tobacco: Never   Substance and Sexual Activity    Alcohol use: Yes     Alcohol/week: 1.0 standard drink of alcohol     Types: 1 Cans of beer per week     Comment: 4-5 beers a week.    Drug use: Never    Sexual activity: Not on file   Other Topics Concern    Not on file   Social History Narrative    Not on file     Social Drivers of Health     Financial Resource Strain: Low Risk   (9/11/2023)    Received from [facility]    Overall Financial Resource Strain (CARDIA)     Difficulty of Paying Living Expenses: Not hard at all   Food Insecurity: No Food Insecurity (9/11/2023)    Received from [facility]    Hunger Vital Sign     Worried About Running Out of Food in the Last Year: Never true     Ran Out of Food in the Last Year: Never true   Transportation Needs: No Transportation Needs (9/11/2023)    Received from [facility]    PRAPARE - Transportation     Lack of Transportation (Medical): No     Lack of Transportation (Non-Medical): No   Physical Activity: Sufficiently Active (9/11/2023)    Received from [facility]    Exercise Vital Sign     Days of Exercise per Week: 7 days     Minutes of Exercise per Session: 60 min   Stress: No Stress Concern Present (9/11/2023)    Received from [facility]    [place] of [employer]ational [employer] - Occupational Stress Questionnaire     Feeling of Stress : Only a little   Social Connections: Socially Integrated (9/11/2023)    Received from [facility]    Social Connection and Isolation Panel [NHANES]     Frequency of Communication with Friends and Family: More than three times a week     Frequency of Social Gatherings with Friends and Family: Once a week     Attends Voodoo Services: More than 4 times per year     Active Member of Clubs or Organizations: Yes     Attends Club or Organization Meetings: More than 4 times per year     Marital Status:    Intimate Partner Violence: Not At Risk (9/11/2023)    Received from [facility]    Humiliation, Afraid, Rape, and Kick questionnaire     Fear of Current or Ex-Partner: No     Emotionally Abused: No     Physically Abused: No     Sexually Abused: No   Housing Stability: Unknown (9/11/2023)    Received from [facility]    Housing Stability Vital Sign     Unable to Pay for Housing in the Last Year: No     Number of Places Lived in the Last Year: Not on file     Unstable Housing in the Last Year: No        Surgical History[3]          Physical Exam:    Right elbow: Healed incision site at the lateral elbow, there is no redness warmth or drainage.  He is appropriately tender at the incision site and at the common extensor tendon origin the lateral elbow.  He has good range of motion at the elbow, no motor deficits present at the elbow, strength testing at the wrist was deferred in setting of recent procedure.    Assessment   1. Lateral epicondylitis of right elbow            Patient is 1 week status post Tenjet for treatment of right elbow lateral epicondylitis, doing well.      Plan: Patient can begin some gentle stretching, plan to resume rehabilitation exercises next week with some light eccentric loading of the extensor compartments, progressing as tolerated by symptoms.  He will continue to maintain a 5 pound weight restriction with right upper extremity.  He will follow-up in 5 weeks for reassessment.         [1] No past medical history on file.  [2]   Allergies  Allergen Reactions    Codeine Nausea And Vomiting, Nausea Only and Hives     nausea   [3]   Past Surgical History:  Procedure Laterality Date    HERNIA REPAIR  2000    KNEE CARTILAGE SURGERY Right 2019    TONSILLECTOMY

## 2025-04-20 LAB
CALPROTECTIN STL-MCNT: 23 MCG/G
ELASTASE PANC STL-MCNT: 505 MCG/G
REDUCING SUBS STL QL: NEGATIVE

## 2025-04-24 ENCOUNTER — APPOINTMENT (OUTPATIENT)
Dept: GASTROENTEROLOGY | Facility: HOSPITAL | Age: 50
End: 2025-04-24
Payer: COMMERCIAL

## 2025-05-05 ENCOUNTER — APPOINTMENT (OUTPATIENT)
Dept: PRIMARY CARE | Facility: CLINIC | Age: 50
End: 2025-05-05
Payer: COMMERCIAL

## 2025-05-05 VITALS
HEART RATE: 70 BPM | HEIGHT: 69 IN | DIASTOLIC BLOOD PRESSURE: 77 MMHG | BODY MASS INDEX: 24.5 KG/M2 | RESPIRATION RATE: 20 BRPM | SYSTOLIC BLOOD PRESSURE: 117 MMHG | OXYGEN SATURATION: 97 % | WEIGHT: 165.4 LBS

## 2025-05-05 DIAGNOSIS — Z23 IMMUNIZATION DUE: ICD-10-CM

## 2025-05-05 DIAGNOSIS — Z12.5 SCREENING FOR PROSTATE CANCER: ICD-10-CM

## 2025-05-05 DIAGNOSIS — Z00.00 ANNUAL PHYSICAL EXAM: Primary | ICD-10-CM

## 2025-05-05 PROCEDURE — 90715 TDAP VACCINE 7 YRS/> IM: CPT | Performed by: INTERNAL MEDICINE

## 2025-05-05 PROCEDURE — 90471 IMMUNIZATION ADMIN: CPT | Performed by: INTERNAL MEDICINE

## 2025-05-05 PROCEDURE — 1036F TOBACCO NON-USER: CPT | Performed by: INTERNAL MEDICINE

## 2025-05-05 PROCEDURE — 3008F BODY MASS INDEX DOCD: CPT | Performed by: INTERNAL MEDICINE

## 2025-05-05 PROCEDURE — 99396 PREV VISIT EST AGE 40-64: CPT | Performed by: INTERNAL MEDICINE

## 2025-05-05 RX ORDER — KETOCONAZOLE 20 MG/G
CREAM TOPICAL
COMMUNITY
Start: 2024-06-20 | End: 2025-05-05 | Stop reason: WASHOUT

## 2025-05-05 ASSESSMENT — ENCOUNTER SYMPTOMS
BLOOD IN STOOL: 0
SLEEP DISTURBANCE: 0
SHORTNESS OF BREATH: 0
HEADACHES: 0
FATIGUE: 0
CONSTIPATION: 0
DIZZINESS: 0

## 2025-05-05 NOTE — H&P (VIEW-ONLY)
"Subjective   Patient ID: Michael Tellez is a 49 y.o. male who presents for Annual Exam.    Pt here for physical.    Overall is doing well. Is seeing Dr. Fitch for acid reflux. Plan is for EGD later this month.    Sleeps on average 8 hours a night. Works out with a  once a week, then goes to the gym another 3-4x a week. In the summer will ride his bike more often.      Review of Systems   Constitutional:  Negative for fatigue.   Respiratory:  Negative for shortness of breath.    Cardiovascular:  Negative for chest pain.   Gastrointestinal:  Negative for blood in stool and constipation.   Neurological:  Negative for dizziness and headaches.   Psychiatric/Behavioral:  Negative for sleep disturbance.        /77 (BP Location: Right arm, Patient Position: Sitting)   Pulse 70   Resp 20   Ht 1.748 m (5' 8.8\")   Wt 75 kg (165 lb 6.4 oz)   SpO2 97%   BMI 24.57 kg/m²   Objective   Physical Exam  Constitutional:       General: He is not in acute distress.     Appearance: He is not ill-appearing, toxic-appearing or diaphoretic.   HENT:      Head: Normocephalic and atraumatic.   Eyes:      Conjunctiva/sclera: Conjunctivae normal.   Cardiovascular:      Rate and Rhythm: Normal rate and regular rhythm.      Heart sounds: No murmur heard.     No friction rub. No gallop.   Pulmonary:      Effort: Pulmonary effort is normal. No respiratory distress.      Breath sounds: No stridor. No wheezing, rhonchi or rales.   Abdominal:      General: Abdomen is flat. Bowel sounds are normal. There is no distension.      Palpations: Abdomen is soft.      Tenderness: There is no abdominal tenderness. There is no guarding.   Neurological:      Mental Status: He is alert.       Assessment/Plan   Problem List Items Addressed This Visit    None  Visit Diagnoses         Codes      Annual physical exam    -  Primary Z00.00      Screening for prostate cancer     Z12.5    Relevant Orders    PSA        -Labwork as above.  -TDAP today. " Discussed shingles vaccine.  -Will follow up EGD results.  -Encouraged to continue exercising as he has been. Will be mindful of cardiac/prostate screening given family hx.         Annemarie Calloway MD 05/05/25 8:23 AM

## 2025-05-15 LAB — PSA SERPL-MCNC: 1.08 NG/ML

## 2025-05-22 ENCOUNTER — APPOINTMENT (OUTPATIENT)
Dept: ORTHOPEDIC SURGERY | Facility: CLINIC | Age: 50
End: 2025-05-22
Payer: COMMERCIAL

## 2025-05-22 DIAGNOSIS — M77.12 LATERAL EPICONDYLITIS OF LEFT ELBOW: Primary | ICD-10-CM

## 2025-05-22 DIAGNOSIS — M77.11 LATERAL EPICONDYLITIS OF RIGHT ELBOW: ICD-10-CM

## 2025-05-22 PROCEDURE — 99214 OFFICE O/P EST MOD 30 MIN: CPT | Performed by: FAMILY MEDICINE

## 2025-05-22 PROCEDURE — 1036F TOBACCO NON-USER: CPT | Performed by: FAMILY MEDICINE

## 2025-05-22 NOTE — PROGRESS NOTES
History of Present Illness   Chief Complaint   Patient presents with    Right Elbow - Follow-up     FUV R LATERAL EPICONDYLITIS       The patient is 49 y.o. male here for 6 week follow up status post Tenjet procedure for treatment of right lateral epicondylitis, DOS 4/10/25.  Patient is well.  He denies any residual pain to the lateral elbow.  He has been adherent to his weight restrictions.  He has noticed some very mild discomfort over the medial aspect of the elbow that was not previously present but says it is very minimal.      With regards to left elbow he has history of similar lateral epicondylitis of the left elbow.  He did have lateral elbow injection with me 4 months ago with more recent recurrence of pain over the past month.  Similar pain with lifting things, day-to-day activity, gripping.  We had discussed potential percutaneous tenotomy procedure for his left elbow if he recovered well from the right.  He says that he would like to pursue similar procedure send of the summer.    Medical History[1]    Medication Documentation Review Audit       Reviewed by Rebecca Cervantes MA (Medical Assistant) on 05/05/25 at 0808      Medication Order Taking? Sig Documenting Provider Last Dose Status   ketoconazole (NIZOral) 2 % cream [id number]  PLEASE SEE ATTACHED FOR DETAILED DIRECTIONS   Patient not taking: Reported on 5/5/2025    Historical MD Katina  Flag for Review   multivitamin tablet [id number] Yes Take 1 tablet by mouth once daily. Historical Provider, MD  Active   omeprazole (PriLOSEC) 40 mg DR capsule [id number]  Take 1 capsule (40 mg) by mouth once daily. Do not crush or chew.   Patient not taking: Reported on 5/5/2025    Davian Fitch MD  Flag for Review   omeprazole OTC (PriLOSEC OTC) 20 mg EC tablet [id number]  Take 1 tablet (20 mg) by mouth once daily in the morning. Take before meals. Do not crush, chew, or split.   Patient not taking: Reported on 5/5/2025    Historical MD Katina  Flag for  Review                    RX Allergies[2]    Social History     Socioeconomic History    Marital status:      Spouse name: Not on file    Number of children: Not on file    Years of education: Not on file    Highest education level: Not on file   Occupational History    Not on file   Tobacco Use    Smoking status: Never    Smokeless tobacco: Never   Substance and Sexual Activity    Alcohol use: Not Currently     Alcohol/week: 1.0 standard drink of alcohol     Types: 1 Cans of beer per week    Drug use: Never    Sexual activity: Not on file   Other Topics Concern    Not on file   Social History Narrative    Not on file     Social Drivers of Health     Financial Resource Strain: Low Risk  (9/11/2023)    Received from [facility]    Overall Financial Resource Strain (CARDIA)     Difficulty of Paying Living Expenses: Not hard at all   Food Insecurity: No Food Insecurity (9/11/2023)    Received from [facility]    Hunger Vital Sign     Worried About Running Out of Food in the Last Year: Never true     Ran Out of Food in the Last Year: Never true   Transportation Needs: No Transportation Needs (9/11/2023)    Received from [facility]    PRAPARE - Transportation     Lack of Transportation (Medical): No     Lack of Transportation (Non-Medical): No   Physical Activity: Sufficiently Active (9/11/2023)    Received from [facility]    Exercise Vital Sign     Days of Exercise per Week: 7 days     Minutes of Exercise per Session: 60 min   Stress: No Stress Concern Present (9/11/2023)    Received from [facility]    [place] [place] of Occupational Health - Occupational Stress Questionnaire     Feeling of Stress : Only a little   Social Connections: Socially Integrated (9/11/2023)    Received from [facility]    Social Connection and Isolation Panel [NHANES]     Frequency of Communication with Friends and Family: More than three times a week     Frequency of Social Gatherings with Friends and Family: Once a week      Attends Taoist Services: More than 4 times per year     Active Member of Clubs or Organizations: Yes     Attends Club or Organization Meetings: More than 4 times per year     Marital Status:    Intimate Partner Violence: Not At Risk (9/11/2023)    Received from [facility]    Humiliation, Afraid, Rape, and Kick questionnaire     Fear of Current or Ex-Partner: No     Emotionally Abused: No     Physically Abused: No     Sexually Abused: No   Housing Stability: Unknown (9/11/2023)    Received from [facility]    Housing Stability Vital Sign     Unable to Pay for Housing in the Last Year: No     Number of Places Lived in the Last Year: Not on file     Unstable Housing in the Last Year: No       Surgical History[3]          Physical Exam:    Right elbow: Healed incision site at the lateral elbow, there is no redness warmth or drainage.  Very mild residual tenderness at the lateral epicondyle/common extensor tendon origin.  He has full range of motion at the elbow without pain.  There is no pain or weakness with resisted wrist or third finger extension at the lateral elbow.    Left elbow: Normal appearance, no swelling or skin changes.  There is full range of motion.  There is tenderness to the lateral epicondyle/common extensor tendon origin.  There is exacerbation of lateral elbow pain with resisted wrist and third finger extension.    Assessment   1. Lateral epicondylitis of left elbow        2. Lateral epicondylitis of right elbow              Plan:    With regards to his left elbow he is interested in pursuing percutaneous tenotomy procedure with Tenjet given similar symptoms on his left elbow and good response to procedure for his right elbow.  He understands the risks and benefits of procedure.  Understands post procedure expectations/restrictions.  He will call surgery scheduling to get this scheduled likely in August.  Right elbow: 6-week status post percutaneous tenotomy procedure, doing well, he has  no significant pain with normal day-to-day activity.  At this time he can begin activities without restrictions, no longer has 5 pound restriction, anticipate that he will do well moving forward.  He will follow-up as needed for his right elbow.       [1] No past medical history on file.  [2]   Allergies  Allergen Reactions    Codeine Nausea And Vomiting, Nausea Only and Hives     nausea   [3]   Past Surgical History:  Procedure Laterality Date    ELBOW SURGERY Right     Tendon surgery    HERNIA REPAIR  2000    KNEE CARTILAGE SURGERY Right 2019    TONSILLECTOMY

## 2025-05-27 ENCOUNTER — HOSPITAL ENCOUNTER (OUTPATIENT)
Dept: GASTROENTEROLOGY | Facility: HOSPITAL | Age: 50
Discharge: HOME | End: 2025-05-27
Payer: COMMERCIAL

## 2025-05-27 ENCOUNTER — ANESTHESIA (OUTPATIENT)
Dept: GASTROENTEROLOGY | Facility: HOSPITAL | Age: 50
End: 2025-05-27
Payer: COMMERCIAL

## 2025-05-27 ENCOUNTER — ANESTHESIA EVENT (OUTPATIENT)
Dept: GASTROENTEROLOGY | Facility: HOSPITAL | Age: 50
End: 2025-05-27
Payer: COMMERCIAL

## 2025-05-27 VITALS
DIASTOLIC BLOOD PRESSURE: 86 MMHG | RESPIRATION RATE: 18 BRPM | SYSTOLIC BLOOD PRESSURE: 127 MMHG | OXYGEN SATURATION: 98 % | TEMPERATURE: 97.5 F | HEART RATE: 70 BPM | HEIGHT: 68 IN | WEIGHT: 165.79 LBS | BODY MASS INDEX: 25.13 KG/M2

## 2025-05-27 DIAGNOSIS — R10.13 DYSPEPSIA: ICD-10-CM

## 2025-05-27 DIAGNOSIS — R10.9 ABDOMINAL DISCOMFORT: ICD-10-CM

## 2025-05-27 PROCEDURE — A43239 PR EDG TRANSORAL BIOPSY SINGLE/MULTIPLE: Performed by: ANESTHESIOLOGIST ASSISTANT

## 2025-05-27 PROCEDURE — 7100000010 HC PHASE TWO TIME - EACH INCREMENTAL 1 MINUTE

## 2025-05-27 PROCEDURE — 2500000005 HC RX 250 GENERAL PHARMACY W/O HCPCS: Performed by: ANESTHESIOLOGIST ASSISTANT

## 2025-05-27 PROCEDURE — 2500000004 HC RX 250 GENERAL PHARMACY W/ HCPCS (ALT 636 FOR OP/ED): Mod: JW | Performed by: ANESTHESIOLOGIST ASSISTANT

## 2025-05-27 PROCEDURE — 3700000001 HC GENERAL ANESTHESIA TIME - INITIAL BASE CHARGE

## 2025-05-27 PROCEDURE — A43239 PR EDG TRANSORAL BIOPSY SINGLE/MULTIPLE: Performed by: ANESTHESIOLOGY

## 2025-05-27 PROCEDURE — 43239 EGD BIOPSY SINGLE/MULTIPLE: CPT | Performed by: STUDENT IN AN ORGANIZED HEALTH CARE EDUCATION/TRAINING PROGRAM

## 2025-05-27 PROCEDURE — 7100000009 HC PHASE TWO TIME - INITIAL BASE CHARGE

## 2025-05-27 PROCEDURE — 3700000002 HC GENERAL ANESTHESIA TIME - EACH INCREMENTAL 1 MINUTE

## 2025-05-27 RX ORDER — LIDOCAINE HYDROCHLORIDE 20 MG/ML
INJECTION, SOLUTION INFILTRATION; PERINEURAL AS NEEDED
Status: DISCONTINUED | OUTPATIENT
Start: 2025-05-27 | End: 2025-05-27

## 2025-05-27 RX ORDER — LIDOCAINE HYDROCHLORIDE 20 MG/ML
SOLUTION OROPHARYNGEAL AS NEEDED
Status: DISCONTINUED | OUTPATIENT
Start: 2025-05-27 | End: 2025-05-27

## 2025-05-27 RX ORDER — LIDOCAINE HCL/PF 100 MG/5ML
SYRINGE (ML) INTRAVENOUS AS NEEDED
Status: DISCONTINUED | OUTPATIENT
Start: 2025-05-27 | End: 2025-05-27

## 2025-05-27 RX ORDER — ONDANSETRON HYDROCHLORIDE 2 MG/ML
4 INJECTION, SOLUTION INTRAVENOUS ONCE AS NEEDED
Status: DISCONTINUED | OUTPATIENT
Start: 2025-05-27 | End: 2025-05-28 | Stop reason: HOSPADM

## 2025-05-27 RX ORDER — PROPOFOL 10 MG/ML
INJECTION, EMULSION INTRAVENOUS AS NEEDED
Status: DISCONTINUED | OUTPATIENT
Start: 2025-05-27 | End: 2025-05-27

## 2025-05-27 RX ADMIN — PROPOFOL 150 MCG/KG/MIN: 10 INJECTION, EMULSION INTRAVENOUS at 11:57

## 2025-05-27 RX ADMIN — LIDOCAINE HYDROCHLORIDE 50 MG: 20 INJECTION, SOLUTION INTRAVENOUS at 11:56

## 2025-05-27 RX ADMIN — LIDOCAINE HYDROCHLORIDE 5 ML: 20 SOLUTION ORAL; TOPICAL at 11:53

## 2025-05-27 RX ADMIN — PROPOFOL 50 MG: 10 INJECTION, EMULSION INTRAVENOUS at 11:59

## 2025-05-27 RX ADMIN — PROPOFOL 100 MG: 10 INJECTION, EMULSION INTRAVENOUS at 11:56

## 2025-05-27 SDOH — HEALTH STABILITY: MENTAL HEALTH: CURRENT SMOKER: 0

## 2025-05-27 ASSESSMENT — PAIN SCALES - GENERAL
PAINLEVEL_OUTOF10: 0 - NO PAIN

## 2025-05-27 ASSESSMENT — COLUMBIA-SUICIDE SEVERITY RATING SCALE - C-SSRS
2. HAVE YOU ACTUALLY HAD ANY THOUGHTS OF KILLING YOURSELF?: NO
1. IN THE PAST MONTH, HAVE YOU WISHED YOU WERE DEAD OR WISHED YOU COULD GO TO SLEEP AND NOT WAKE UP?: NO
6. HAVE YOU EVER DONE ANYTHING, STARTED TO DO ANYTHING, OR PREPARED TO DO ANYTHING TO END YOUR LIFE?: NO

## 2025-05-27 ASSESSMENT — PAIN - FUNCTIONAL ASSESSMENT: PAIN_FUNCTIONAL_ASSESSMENT: 0-10

## 2025-05-27 NOTE — ANESTHESIA PREPROCEDURE EVALUATION
Patient: Michael Tellez    Procedure Information       Anesthesia Start Date/Time: 05/27/25 1152    Scheduled providers: Davian Fitch MD    Procedure: EGD    Location: [facility]            Relevant Problems   Anesthesia (within normal limits)       Clinical information reviewed:    Allergies  Meds               NPO Detail:  NPO/Void Status  Date of Last Liquid: 05/26/25  Time of Last Liquid: 2130  Date of Last Solid: 05/26/25  Time of Last Solid: 1900         Physical Exam    Airway  Mallampati: II  TM distance: >3 FB  Neck ROM: full  Mouth opening: 3 or more finger widths     Cardiovascular - normal exam   Dental - normal exam     Pulmonary - normal exam   Abdominal - normal exam           Anesthesia Plan    History of general anesthesia?: yes  History of complications of general anesthesia?: no    ASA 1     MAC     The patient is not a current smoker.  Patient was previously instructed to abstain from smoking on day of procedure.  Patient did not smoke on day of procedure.    intravenous induction   Anesthetic plan and risks discussed with patient.    Plan discussed with [facility].

## 2025-05-27 NOTE — DISCHARGE INSTRUCTIONS

## 2025-05-27 NOTE — ANESTHESIA POSTPROCEDURE EVALUATION
Patient: Michael Tellez    Procedure Summary       Date: 05/27/25 Room / Location: [facility]    Anesthesia Start: 1152 Anesthesia Stop: 1212    Procedure: EGD Diagnosis:       Dyspepsia      Abdominal discomfort    Scheduled Providers: Davian Fitch MD Responsible Provider: Edouard Sue MD    Anesthesia Type: Not recorded ASA Status: Not recorded            Anesthesia Type: No value filed.    Vitals Value Taken Time   /82 05/27/25 12:11   Temp 36.6 05/27/25 12:12   Pulse 75 05/27/25 12:11   Resp 14 05/27/25 12:12   SpO2 97 % 05/27/25 12:11   Vitals shown include unfiled device data.    Anesthesia Post Evaluation    Patient participation: complete - patient participated  Level of consciousness: awake  Pain management: adequate  Airway patency: patent  Cardiovascular status: acceptable  Respiratory status: acceptable  Hydration status: acceptable  Postoperative Nausea and Vomiting: none        No notable events documented.

## 2025-06-09 DIAGNOSIS — R10.13 DYSPEPSIA: Primary | ICD-10-CM

## 2025-06-09 LAB
LABORATORY COMMENT REPORT: NORMAL
PATH REPORT.FINAL DX SPEC: NORMAL
PATH REPORT.GROSS SPEC: NORMAL
PATH REPORT.TOTAL CANCER: NORMAL

## 2025-06-09 RX ORDER — OMEPRAZOLE 40 MG/1
40 CAPSULE, DELAYED RELEASE ORAL DAILY
Qty: 30 CAPSULE | Refills: 11 | Status: SHIPPED | OUTPATIENT
Start: 2025-06-09 | End: 2026-06-09

## 2025-08-02 DIAGNOSIS — R10.13 DYSPEPSIA: ICD-10-CM

## 2025-08-04 RX ORDER — OMEPRAZOLE 40 MG/1
40 CAPSULE, DELAYED RELEASE ORAL DAILY
Qty: 90 CAPSULE | Refills: 3 | Status: SHIPPED | OUTPATIENT
Start: 2025-08-04 | End: 2026-08-04

## 2025-08-14 ENCOUNTER — HOSPITAL ENCOUNTER (OUTPATIENT)
Dept: PAIN MEDICINE | Facility: CLINIC | Age: 50
Discharge: HOME | End: 2025-08-14
Payer: COMMERCIAL

## 2025-08-14 VITALS
DIASTOLIC BLOOD PRESSURE: 74 MMHG | TEMPERATURE: 97.2 F | HEART RATE: 78 BPM | SYSTOLIC BLOOD PRESSURE: 132 MMHG | OXYGEN SATURATION: 97 % | RESPIRATION RATE: 16 BRPM

## 2025-08-14 DIAGNOSIS — M77.12 LATERAL EPICONDYLITIS OF LEFT ELBOW: ICD-10-CM

## 2025-08-14 PROCEDURE — 24357 REPAIR ELBOW PERC: CPT | Performed by: FAMILY MEDICINE

## 2025-08-14 PROCEDURE — 76942 ECHO GUIDE FOR BIOPSY: CPT | Performed by: FAMILY MEDICINE

## 2025-08-14 PROCEDURE — 2500000004 HC RX 250 GENERAL PHARMACY W/ HCPCS (ALT 636 FOR OP/ED): Performed by: FAMILY MEDICINE

## 2025-08-14 PROCEDURE — 7100000009 HC PHASE TWO TIME - INITIAL BASE CHARGE

## 2025-08-14 PROCEDURE — 2720000007 HC OR 272 NO HCPCS

## 2025-08-14 RX ORDER — LIDOCAINE HYDROCHLORIDE 10 MG/ML
INJECTION, SOLUTION EPIDURAL; INFILTRATION; INTRACAUDAL; PERINEURAL AS NEEDED
Status: COMPLETED | OUTPATIENT
Start: 2025-08-14 | End: 2025-08-14

## 2025-08-14 RX ORDER — BUPIVACAINE HYDROCHLORIDE 2.5 MG/ML
INJECTION, SOLUTION EPIDURAL; INFILTRATION; INTRACAUDAL; PERINEURAL AS NEEDED
Status: COMPLETED | OUTPATIENT
Start: 2025-08-14 | End: 2025-08-14

## 2025-08-14 RX ADMIN — BUPIVACAINE HYDROCHLORIDE 5 ML: 2.5 INJECTION, SOLUTION EPIDURAL; INFILTRATION; INTRACAUDAL; PERINEURAL at 14:19

## 2025-08-14 RX ADMIN — LIDOCAINE HYDROCHLORIDE 5 ML: 10 INJECTION, SOLUTION EPIDURAL; INFILTRATION; INTRACAUDAL; PERINEURAL at 14:19

## 2025-08-14 ASSESSMENT — PAIN - FUNCTIONAL ASSESSMENT
PAIN_FUNCTIONAL_ASSESSMENT: 0-10
PAIN_FUNCTIONAL_ASSESSMENT: 0-10

## 2025-08-14 ASSESSMENT — PAIN SCALES - GENERAL
PAINLEVEL_OUTOF10: 0 - NO PAIN
PAINLEVEL_OUTOF10: 3

## 2025-08-21 ENCOUNTER — APPOINTMENT (OUTPATIENT)
Dept: ORTHOPEDIC SURGERY | Facility: CLINIC | Age: 50
End: 2025-08-21
Payer: COMMERCIAL

## 2025-08-21 DIAGNOSIS — M77.12 LATERAL EPICONDYLITIS OF LEFT ELBOW: Primary | ICD-10-CM

## 2025-08-21 PROCEDURE — 1036F TOBACCO NON-USER: CPT | Performed by: FAMILY MEDICINE

## 2025-08-21 PROCEDURE — 99024 POSTOP FOLLOW-UP VISIT: CPT | Performed by: FAMILY MEDICINE

## 2025-10-02 ENCOUNTER — APPOINTMENT (OUTPATIENT)
Dept: ORTHOPEDIC SURGERY | Facility: CLINIC | Age: 50
End: 2025-10-02
Payer: COMMERCIAL

## 2025-10-20 ENCOUNTER — APPOINTMENT (OUTPATIENT)
Dept: GASTROENTEROLOGY | Facility: CLINIC | Age: 50
End: 2025-10-20
Payer: COMMERCIAL

## 2026-05-06 ENCOUNTER — APPOINTMENT (OUTPATIENT)
Dept: PRIMARY CARE | Facility: CLINIC | Age: 51
End: 2026-05-06
Payer: COMMERCIAL